# Patient Record
Sex: FEMALE | Race: WHITE | NOT HISPANIC OR LATINO | Employment: FULL TIME | URBAN - METROPOLITAN AREA
[De-identification: names, ages, dates, MRNs, and addresses within clinical notes are randomized per-mention and may not be internally consistent; named-entity substitution may affect disease eponyms.]

---

## 2023-02-24 LAB
ALANINE AMINOTRANSFERASE (SGPT) (U/L) IN SER/PLAS: 45 U/L (ref 7–45)
ALBUMIN (G/DL) IN SER/PLAS: 4.4 G/DL (ref 3.4–5)
ALKALINE PHOSPHATASE (U/L) IN SER/PLAS: 81 U/L (ref 33–110)
ANION GAP IN SER/PLAS: 13 MMOL/L (ref 10–20)
ASPARTATE AMINOTRANSFERASE (SGOT) (U/L) IN SER/PLAS: 30 U/L (ref 9–39)
BILIRUBIN TOTAL (MG/DL) IN SER/PLAS: 0.3 MG/DL (ref 0–1.2)
CALCIUM (MG/DL) IN SER/PLAS: 9.4 MG/DL (ref 8.6–10.3)
CARBON DIOXIDE, TOTAL (MMOL/L) IN SER/PLAS: 25 MMOL/L (ref 21–32)
CHLORIDE (MMOL/L) IN SER/PLAS: 103 MMOL/L (ref 98–107)
CREATININE (MG/DL) IN SER/PLAS: 0.56 MG/DL (ref 0.5–1.05)
GFR FEMALE: >90 ML/MIN/1.73M2
GLUCOSE (MG/DL) IN SER/PLAS: 168 MG/DL (ref 74–99)
POTASSIUM (MMOL/L) IN SER/PLAS: 4.3 MMOL/L (ref 3.5–5.3)
PROTEIN TOTAL: 8.1 G/DL (ref 6.4–8.2)
SEDIMENTATION RATE, ERYTHROCYTE: 29 MM/H (ref 0–30)
SODIUM (MMOL/L) IN SER/PLAS: 137 MMOL/L (ref 136–145)
UREA NITROGEN (MG/DL) IN SER/PLAS: 17 MG/DL (ref 6–23)

## 2023-04-06 LAB — FUNGAL SCREEN, YEAST: ABNORMAL

## 2023-04-29 DIAGNOSIS — E11.9 TYPE 2 DIABETES MELLITUS WITHOUT COMPLICATIONS (MULTI): ICD-10-CM

## 2023-07-14 LAB
ALANINE AMINOTRANSFERASE (SGPT) (U/L) IN SER/PLAS: 34 U/L (ref 7–45)
ALBUMIN (G/DL) IN SER/PLAS: 4.4 G/DL (ref 3.4–5)
ALKALINE PHOSPHATASE (U/L) IN SER/PLAS: 77 U/L (ref 33–110)
ANION GAP IN SER/PLAS: 14 MMOL/L (ref 10–20)
ASPARTATE AMINOTRANSFERASE (SGOT) (U/L) IN SER/PLAS: 26 U/L (ref 9–39)
BILIRUBIN TOTAL (MG/DL) IN SER/PLAS: 0.3 MG/DL (ref 0–1.2)
CALCIUM (MG/DL) IN SER/PLAS: 9.8 MG/DL (ref 8.6–10.3)
CARBON DIOXIDE, TOTAL (MMOL/L) IN SER/PLAS: 23 MMOL/L (ref 21–32)
CHLORIDE (MMOL/L) IN SER/PLAS: 103 MMOL/L (ref 98–107)
CREATININE (MG/DL) IN SER/PLAS: 0.55 MG/DL (ref 0.5–1.05)
ESTIMATED AVERAGE GLUCOSE FOR HBA1C: 177 MG/DL
GFR FEMALE: >90 ML/MIN/1.73M2
GLUCOSE (MG/DL) IN SER/PLAS: 124 MG/DL (ref 74–99)
HEMOGLOBIN A1C/HEMOGLOBIN TOTAL IN BLOOD: 7.8 %
POTASSIUM (MMOL/L) IN SER/PLAS: 4.4 MMOL/L (ref 3.5–5.3)
PROTEIN TOTAL: 8.2 G/DL (ref 6.4–8.2)
SEDIMENTATION RATE, ERYTHROCYTE: 38 MM/H (ref 0–30)
SODIUM (MMOL/L) IN SER/PLAS: 136 MMOL/L (ref 136–145)
UREA NITROGEN (MG/DL) IN SER/PLAS: 19 MG/DL (ref 6–23)

## 2023-10-18 DIAGNOSIS — E87.6 HYPOKALEMIA: ICD-10-CM

## 2023-10-19 RX ORDER — POTASSIUM CHLORIDE 1500 MG/1
20 TABLET, EXTENDED RELEASE ORAL DAILY
Qty: 90 TABLET | Refills: 3 | Status: SHIPPED | OUTPATIENT
Start: 2023-10-19

## 2023-10-26 ENCOUNTER — OFFICE VISIT (OUTPATIENT)
Dept: PRIMARY CARE | Facility: CLINIC | Age: 53
End: 2023-10-26
Payer: COMMERCIAL

## 2023-10-26 VITALS
BODY MASS INDEX: 39.89 KG/M2 | DIASTOLIC BLOOD PRESSURE: 76 MMHG | OXYGEN SATURATION: 95 % | HEIGHT: 65 IN | HEART RATE: 93 BPM | TEMPERATURE: 97 F | SYSTOLIC BLOOD PRESSURE: 124 MMHG | WEIGHT: 239.4 LBS | RESPIRATION RATE: 16 BRPM

## 2023-10-26 DIAGNOSIS — N39.0 URINARY TRACT INFECTION WITHOUT HEMATURIA, SITE UNSPECIFIED: ICD-10-CM

## 2023-10-26 PROBLEM — E11.9 DIABETES MELLITUS (MULTI): Status: ACTIVE | Noted: 2023-10-26

## 2023-10-26 PROBLEM — R10.9 ABDOMINAL PAIN: Status: ACTIVE | Noted: 2023-10-26

## 2023-10-26 PROBLEM — J45.909 ASTHMA (HHS-HCC): Status: ACTIVE | Noted: 2023-10-26

## 2023-10-26 PROBLEM — B37.9 YEAST INFECTION: Status: ACTIVE | Noted: 2023-10-26

## 2023-10-26 PROBLEM — R21 RASH AND OTHER NONSPECIFIC SKIN ERUPTION: Status: ACTIVE | Noted: 2022-08-23

## 2023-10-26 PROBLEM — E88.09 HYPERPROTEINEMIA: Status: ACTIVE | Noted: 2023-10-26

## 2023-10-26 PROBLEM — E11.9 TYPE 2 DIABETES MELLITUS WITHOUT COMPLICATIONS (MULTI): Status: ACTIVE | Noted: 2023-10-26

## 2023-10-26 PROBLEM — N95.1 PERIMENOPAUSAL: Status: ACTIVE | Noted: 2023-10-26

## 2023-10-26 PROBLEM — R16.0 HEPATOMEGALY: Status: ACTIVE | Noted: 2023-10-26

## 2023-10-26 PROBLEM — R92.8 ABNORMAL FINDING ON BREAST IMAGING: Status: ACTIVE | Noted: 2023-10-26

## 2023-10-26 PROBLEM — F32.A DEPRESSION: Status: ACTIVE | Noted: 2023-10-26

## 2023-10-26 PROBLEM — D89.0 POLYCLONAL GAMMOPATHY: Status: ACTIVE | Noted: 2019-12-02

## 2023-10-26 PROBLEM — J01.10 ACUTE FRONTAL SINUSITIS: Status: ACTIVE | Noted: 2023-10-26

## 2023-10-26 PROBLEM — K76.0 FATTY INFILTRATION OF LIVER: Status: ACTIVE | Noted: 2023-10-26

## 2023-10-26 PROBLEM — N91.0 DELAYED PERIOD: Status: ACTIVE | Noted: 2023-10-26

## 2023-10-26 PROBLEM — E55.9 VITAMIN D DEFICIENCY: Status: ACTIVE | Noted: 2023-10-26

## 2023-10-26 PROBLEM — L30.9 DERMATITIS: Status: ACTIVE | Noted: 2023-10-26

## 2023-10-26 PROBLEM — E11.3293 MILD NONPROLIFERATIVE DIABETIC RETINOPATHY OF BOTH EYES ASSOCIATED WITH TYPE 2 DIABETES MELLITUS (MULTI): Status: ACTIVE | Noted: 2023-10-26

## 2023-10-26 PROBLEM — J20.9 ACUTE BRONCHITIS: Status: ACTIVE | Noted: 2023-10-26

## 2023-10-26 PROBLEM — F41.9 ANXIETY: Status: ACTIVE | Noted: 2023-10-26

## 2023-10-26 PROBLEM — J06.9 ACUTE URI: Status: ACTIVE | Noted: 2023-10-26

## 2023-10-26 PROBLEM — I10 HYPERTENSION: Status: ACTIVE | Noted: 2023-10-26

## 2023-10-26 PROBLEM — H52.13 MYOPIA OF BOTH EYES: Status: ACTIVE | Noted: 2023-10-26

## 2023-10-26 PROBLEM — E83.52 HYPERCALCEMIA: Status: ACTIVE | Noted: 2023-10-26

## 2023-10-26 PROBLEM — R39.15 URINARY URGENCY: Status: ACTIVE | Noted: 2023-10-26

## 2023-10-26 PROBLEM — H53.9 VISION CHANGES: Status: ACTIVE | Noted: 2023-10-26

## 2023-10-26 PROBLEM — R11.2 NAUSEA AND VOMITING: Status: ACTIVE | Noted: 2023-10-26

## 2023-10-26 PROBLEM — E78.00 HYPERCHOLESTEROLEMIA: Status: ACTIVE | Noted: 2023-10-26

## 2023-10-26 PROBLEM — H20.9 IRITIS: Status: ACTIVE | Noted: 2023-10-26

## 2023-10-26 PROBLEM — J02.9 SORE THROAT: Status: ACTIVE | Noted: 2023-10-26

## 2023-10-26 PROBLEM — E87.6 HYPOKALEMIA: Status: ACTIVE | Noted: 2023-10-26

## 2023-10-26 PROBLEM — K63.9 COLON WALL THICKENING: Status: ACTIVE | Noted: 2023-10-26

## 2023-10-26 PROBLEM — N63.10 MASS OF BREAST, RIGHT: Status: ACTIVE | Noted: 2023-10-26

## 2023-10-26 PROBLEM — D64.9 ANEMIA: Status: ACTIVE | Noted: 2023-10-26

## 2023-10-26 PROBLEM — R91.8 LUNG NODULES: Status: ACTIVE | Noted: 2023-10-26

## 2023-10-26 PROBLEM — N89.8 VAGINAL ITCHING: Status: ACTIVE | Noted: 2023-10-26

## 2023-10-26 PROBLEM — R74.8 ELEVATED ALKALINE PHOSPHATASE LEVEL: Status: ACTIVE | Noted: 2023-10-26

## 2023-10-26 PROBLEM — M47.819 SERONEGATIVE SPONDYLOARTHROPATHY: Status: ACTIVE | Noted: 2019-12-02

## 2023-10-26 PROBLEM — R30.0 DYSURIA: Status: ACTIVE | Noted: 2023-10-26

## 2023-10-26 LAB
POC APPEARANCE, URINE: CLEAR
POC BILIRUBIN, URINE: NEGATIVE
POC BLOOD, URINE: ABNORMAL
POC COLOR, URINE: ABNORMAL
POC GLUCOSE, URINE: ABNORMAL MG/DL
POC KETONES, URINE: NEGATIVE MG/DL
POC LEUKOCYTES, URINE: ABNORMAL
POC NITRITE,URINE: NEGATIVE
POC PH, URINE: 5.5 PH
POC PROTEIN, URINE: ABNORMAL MG/DL
POC SPECIFIC GRAVITY, URINE: 1.01
POC UROBILINOGEN, URINE: 0.2 EU/DL

## 2023-10-26 PROCEDURE — 99213 OFFICE O/P EST LOW 20 MIN: CPT | Performed by: INTERNAL MEDICINE

## 2023-10-26 PROCEDURE — 1036F TOBACCO NON-USER: CPT | Performed by: INTERNAL MEDICINE

## 2023-10-26 PROCEDURE — 3078F DIAST BP <80 MM HG: CPT | Performed by: INTERNAL MEDICINE

## 2023-10-26 PROCEDURE — 3051F HG A1C>EQUAL 7.0%<8.0%: CPT | Performed by: INTERNAL MEDICINE

## 2023-10-26 PROCEDURE — 4010F ACE/ARB THERAPY RXD/TAKEN: CPT | Performed by: INTERNAL MEDICINE

## 2023-10-26 PROCEDURE — 81003 URINALYSIS AUTO W/O SCOPE: CPT | Performed by: INTERNAL MEDICINE

## 2023-10-26 PROCEDURE — 87186 SC STD MICRODIL/AGAR DIL: CPT

## 2023-10-26 PROCEDURE — 87086 URINE CULTURE/COLONY COUNT: CPT

## 2023-10-26 PROCEDURE — 3074F SYST BP LT 130 MM HG: CPT | Performed by: INTERNAL MEDICINE

## 2023-10-26 RX ORDER — DICLOFENAC SODIUM 75 MG/1
TABLET, DELAYED RELEASE ORAL
COMMUNITY
Start: 2019-10-09

## 2023-10-26 RX ORDER — CLOBETASOL PROPIONATE 0.5 MG/G
CREAM TOPICAL
COMMUNITY
Start: 2022-08-22 | End: 2024-01-05 | Stop reason: WASHOUT

## 2023-10-26 RX ORDER — ERGOCALCIFEROL 1.25 MG/1
CAPSULE ORAL
COMMUNITY
Start: 2021-02-19

## 2023-10-26 RX ORDER — ALBUTEROL SULFATE 90 UG/1
AEROSOL, METERED RESPIRATORY (INHALATION)
COMMUNITY
Start: 2020-03-10

## 2023-10-26 RX ORDER — TIRZEPATIDE 2.5 MG/.5ML
INJECTION, SOLUTION SUBCUTANEOUS
COMMUNITY
Start: 2022-10-11 | End: 2024-01-05 | Stop reason: WASHOUT

## 2023-10-26 RX ORDER — TRIAMCINOLONE ACETONIDE 1 MG/G
1 CREAM TOPICAL 2 TIMES DAILY
COMMUNITY
Start: 2022-07-29 | End: 2024-01-05 | Stop reason: WASHOUT

## 2023-10-26 RX ORDER — FLUOCINONIDE 0.5 MG/G
CREAM TOPICAL
COMMUNITY
Start: 2018-11-28 | End: 2024-01-05 | Stop reason: WASHOUT

## 2023-10-26 RX ORDER — DULAGLUTIDE 1.5 MG/.5ML
INJECTION, SOLUTION SUBCUTANEOUS
COMMUNITY
Start: 2022-11-07

## 2023-10-26 RX ORDER — ASPIRIN 81 MG/1
81 TABLET ORAL
COMMUNITY
Start: 2019-10-09

## 2023-10-26 RX ORDER — HUMAN INSULIN 100 [IU]/ML
INJECTION, SUSPENSION SUBCUTANEOUS
COMMUNITY
Start: 2021-02-17 | End: 2023-11-20

## 2023-10-26 RX ORDER — METFORMIN HYDROCHLORIDE 500 MG/1
TABLET, EXTENDED RELEASE ORAL
COMMUNITY
Start: 2012-11-29 | End: 2024-01-05 | Stop reason: DRUGHIGH

## 2023-10-26 RX ORDER — ROSUVASTATIN CALCIUM 10 MG/1
1 TABLET, COATED ORAL NIGHTLY
COMMUNITY
Start: 2019-02-18 | End: 2024-02-26 | Stop reason: SDUPTHER

## 2023-10-26 RX ORDER — PREDNISONE 5 MG/1
2.5 TABLET ORAL DAILY
COMMUNITY
End: 2024-01-05 | Stop reason: WASHOUT

## 2023-10-26 RX ORDER — NITROFURANTOIN 25; 75 MG/1; MG/1
CAPSULE ORAL
Qty: 14 CAPSULE | Refills: 0 | Status: SHIPPED | OUTPATIENT
Start: 2023-10-26 | End: 2024-01-05 | Stop reason: WASHOUT

## 2023-10-26 RX ORDER — KETOCONAZOLE 20 MG/ML
SHAMPOO, SUSPENSION TOPICAL
COMMUNITY
Start: 2018-06-18 | End: 2024-01-05 | Stop reason: WASHOUT

## 2023-10-26 RX ORDER — GLIPIZIDE 10 MG/1
1 TABLET, FILM COATED, EXTENDED RELEASE ORAL DAILY
COMMUNITY
Start: 2015-09-11 | End: 2024-02-26 | Stop reason: SDUPTHER

## 2023-10-26 RX ORDER — CLINDAMYCIN PHOSPHATE 11.9 MG/ML
SOLUTION TOPICAL
COMMUNITY
Start: 2018-06-18 | End: 2024-01-05 | Stop reason: WASHOUT

## 2023-10-26 RX ORDER — FLUCONAZOLE 150 MG/1
TABLET ORAL
COMMUNITY

## 2023-10-26 RX ORDER — FENOFIBRATE 160 MG/1
160 TABLET ORAL
COMMUNITY
Start: 2019-10-09 | End: 2024-01-05 | Stop reason: WASHOUT

## 2023-10-26 RX ORDER — LISINOPRIL 20 MG/1
1 TABLET ORAL DAILY
COMMUNITY
Start: 2013-07-29 | End: 2024-02-26 | Stop reason: SDUPTHER

## 2023-10-26 RX ORDER — PAROXETINE 10 MG/1
1 TABLET, FILM COATED ORAL DAILY
COMMUNITY
Start: 2012-07-09 | End: 2024-02-26 | Stop reason: SDUPTHER

## 2023-10-26 RX ORDER — LANOLIN ALCOHOL/MO/W.PET/CERES
CREAM (GRAM) TOPICAL
COMMUNITY
Start: 2021-04-22

## 2023-10-26 ASSESSMENT — ENCOUNTER SYMPTOMS
OCCASIONAL FEELINGS OF UNSTEADINESS: 0
DEPRESSION: 0
LOSS OF SENSATION IN FEET: 0

## 2023-10-26 ASSESSMENT — PATIENT HEALTH QUESTIONNAIRE - PHQ9
2. FEELING DOWN, DEPRESSED OR HOPELESS: NOT AT ALL
SUM OF ALL RESPONSES TO PHQ9 QUESTIONS 1 AND 2: 0
2. FEELING DOWN, DEPRESSED OR HOPELESS: NOT AT ALL
1. LITTLE INTEREST OR PLEASURE IN DOING THINGS: NOT AT ALL
1. LITTLE INTEREST OR PLEASURE IN DOING THINGS: NOT AT ALL
SUM OF ALL RESPONSES TO PHQ9 QUESTIONS 1 AND 2: 0

## 2023-10-26 NOTE — PROGRESS NOTES
"Subjective   Patient ID: Daniela Neri is a 52 y.o. female who presents for UTI.    Here with worsening urgency and dysuria over the last day or so.  No fevers chills nausea vomiting no abdominal pain no flank pain no history of kidney stones    She has been on Farxiga-in the past she has had vaginitis issues-she says this is the first UTI she has had in some time         Review of Systems    Objective   /76 (BP Location: Right arm, Patient Position: Sitting, BP Cuff Size: Large adult)   Pulse 93   Temp 36.1 °C (97 °F)   Resp 16   Ht 1.638 m (5' 4.5\")   Wt 109 kg (239 lb 6.4 oz)   SpO2 95%   BMI 40.46 kg/m²     Physical Exam  Vitals reviewed.   Constitutional:       Appearance: Normal appearance.   HENT:      Head: Normocephalic and atraumatic.   Eyes:      General: No scleral icterus.        Right eye: No discharge.         Left eye: No discharge.      Extraocular Movements: Extraocular movements intact.      Conjunctiva/sclera: Conjunctivae normal.      Pupils: Pupils are equal, round, and reactive to light.   Cardiovascular:      Rate and Rhythm: Normal rate and regular rhythm.      Pulses: Normal pulses.      Heart sounds: Normal heart sounds. No murmur heard.  Pulmonary:      Effort: Pulmonary effort is normal.      Breath sounds: Normal breath sounds. No wheezing or rhonchi.   Abdominal:      General: There is no distension.      Tenderness: There is no abdominal tenderness.      Comments: No abdominal discomfort no back or flank discomfort   Musculoskeletal:         General: No deformity or signs of injury. Normal range of motion.      Cervical back: Normal range of motion and neck supple. No rigidity or tenderness.   Lymphadenopathy:      Cervical: No cervical adenopathy.   Skin:     General: Skin is warm and dry.      Findings: No rash.   Neurological:      General: No focal deficit present.      Mental Status: She is alert and oriented to person, place, and time. Mental status is at baseline. "      Cranial Nerves: No cranial nerve deficit.      Sensory: No sensory deficit.      Gait: Gait normal.   Psychiatric:         Mood and Affect: Mood normal.         Behavior: Behavior normal.         Thought Content: Thought content normal.         Judgment: Judgment normal.         Assessment/Plan   Problem List Items Addressed This Visit             ICD-10-CM    Urinary tract infection N39.0    Relevant Orders    POCT UA Automated manually resulted (Completed)    Urine Culture        Urinary urgency/dysuria-urine analysis suspicious-this Thursday afternoon we will treat empirically with nitrofurantoin she will push fluids, use a probiotic and will await urine culture.  She understands if culture shows bacteria that is not covered by the nitrofurantoin she would need to change antibiotics.  She will call with concerns otherwise follow-up with Dr. Lundberg as directed

## 2023-10-27 NOTE — RESULT ENCOUNTER NOTE
Urine analysis suspicious for urinary tract infection-nitrofurantoin prescribed-awaiting urine culture    Dr. Sargent

## 2023-10-28 LAB — BACTERIA UR CULT: ABNORMAL

## 2023-10-29 NOTE — RESULT ENCOUNTER NOTE
Daniela -the urine culture report confirms that the bacteria should be eradicated by the antibiotic I provided when you were in the office.  Please continue to push lots of fluids and follow-up with any persistent symptoms.    Sincerely,  Osbaldo Sargent MD

## 2023-11-07 DIAGNOSIS — N39.0 URINARY TRACT INFECTION WITHOUT HEMATURIA, SITE UNSPECIFIED: Primary | ICD-10-CM

## 2023-11-08 ENCOUNTER — LAB (OUTPATIENT)
Dept: LAB | Facility: LAB | Age: 53
End: 2023-11-08
Payer: COMMERCIAL

## 2023-11-08 DIAGNOSIS — N39.0 URINARY TRACT INFECTION WITHOUT HEMATURIA, SITE UNSPECIFIED: ICD-10-CM

## 2023-11-08 LAB
APPEARANCE UR: ABNORMAL
BACTERIA #/AREA URNS AUTO: ABNORMAL /HPF
BILIRUB UR STRIP.AUTO-MCNC: NEGATIVE MG/DL
COLOR UR: YELLOW
GLUCOSE UR STRIP.AUTO-MCNC: ABNORMAL MG/DL
KETONES UR STRIP.AUTO-MCNC: NEGATIVE MG/DL
LEUKOCYTE ESTERASE UR QL STRIP.AUTO: ABNORMAL
NITRITE UR QL STRIP.AUTO: NEGATIVE
PH UR STRIP.AUTO: 5 [PH]
PROT UR STRIP.AUTO-MCNC: NEGATIVE MG/DL
RBC # UR STRIP.AUTO: ABNORMAL /UL
RBC #/AREA URNS AUTO: ABNORMAL /HPF
SP GR UR STRIP.AUTO: 1.02
SQUAMOUS #/AREA URNS AUTO: ABNORMAL /HPF
UROBILINOGEN UR STRIP.AUTO-MCNC: <2 MG/DL
WBC #/AREA URNS AUTO: >50 /HPF
WBC CLUMPS #/AREA URNS AUTO: ABNORMAL /HPF

## 2023-11-08 PROCEDURE — 81001 URINALYSIS AUTO W/SCOPE: CPT

## 2023-11-08 PROCEDURE — 87186 SC STD MICRODIL/AGAR DIL: CPT

## 2023-11-08 PROCEDURE — 87086 URINE CULTURE/COLONY COUNT: CPT

## 2023-11-10 LAB — BACTERIA UR CULT: ABNORMAL

## 2023-11-12 DIAGNOSIS — R39.15 URINARY URGENCY: ICD-10-CM

## 2023-11-12 DIAGNOSIS — R39.15 URGENCY OF URINATION: ICD-10-CM

## 2023-11-12 DIAGNOSIS — N39.0 URINARY TRACT INFECTION WITHOUT HEMATURIA, SITE UNSPECIFIED: Primary | ICD-10-CM

## 2023-11-12 RX ORDER — CIPROFLOXACIN 500 MG/1
500 TABLET ORAL 2 TIMES DAILY
Qty: 14 TABLET | Refills: 0 | Status: SHIPPED | OUTPATIENT
Start: 2023-11-12 | End: 2023-11-19

## 2023-11-20 DIAGNOSIS — E11.9 TYPE 2 DIABETES MELLITUS WITHOUT COMPLICATION, WITH LONG-TERM CURRENT USE OF INSULIN (MULTI): Primary | ICD-10-CM

## 2023-11-20 DIAGNOSIS — Z79.4 TYPE 2 DIABETES MELLITUS WITHOUT COMPLICATION, WITH LONG-TERM CURRENT USE OF INSULIN (MULTI): Primary | ICD-10-CM

## 2023-11-20 RX ORDER — HUMAN INSULIN 100 [IU]/ML
INJECTION, SUSPENSION SUBCUTANEOUS
Qty: 15 ML | Refills: 3 | Status: SHIPPED | OUTPATIENT
Start: 2023-11-20

## 2023-11-24 ENCOUNTER — LAB (OUTPATIENT)
Dept: LAB | Facility: LAB | Age: 53
End: 2023-11-24
Payer: COMMERCIAL

## 2023-11-24 DIAGNOSIS — R39.15 URGENCY OF URINATION: ICD-10-CM

## 2023-11-24 DIAGNOSIS — R39.15 URINARY URGENCY: ICD-10-CM

## 2023-11-24 LAB
APPEARANCE UR: ABNORMAL
BACTERIA #/AREA URNS AUTO: ABNORMAL /HPF
BILIRUB UR STRIP.AUTO-MCNC: NEGATIVE MG/DL
COLOR UR: ABNORMAL
GLUCOSE UR STRIP.AUTO-MCNC: ABNORMAL MG/DL
KETONES UR STRIP.AUTO-MCNC: ABNORMAL MG/DL
LEUKOCYTE ESTERASE UR QL STRIP.AUTO: ABNORMAL
NITRITE UR QL STRIP.AUTO: NEGATIVE
PH UR STRIP.AUTO: 5 [PH]
PROT UR STRIP.AUTO-MCNC: NEGATIVE MG/DL
RBC # UR STRIP.AUTO: NEGATIVE /UL
RBC #/AREA URNS AUTO: ABNORMAL /HPF
SP GR UR STRIP.AUTO: 1.03
SQUAMOUS #/AREA URNS AUTO: ABNORMAL /HPF
UROBILINOGEN UR STRIP.AUTO-MCNC: <2 MG/DL
WBC #/AREA URNS AUTO: ABNORMAL /HPF

## 2023-11-24 PROCEDURE — 87086 URINE CULTURE/COLONY COUNT: CPT

## 2023-11-24 PROCEDURE — 81001 URINALYSIS AUTO W/SCOPE: CPT

## 2023-11-25 LAB — BACTERIA UR CULT: NORMAL

## 2023-11-26 NOTE — RESULT ENCOUNTER NOTE
Daniela    Blood follow-up urine studies do not suggest a bladder infection at this time.  The urine analysis however does show a great deal of glucose/sugar which can cause urinary urgency and frequency.    Please continue to push ample fluids.  Please continue to watch your glucose values and work to optimize these as you can.  Please Que[place] back with Dr. Lundberg with any additional concerns.    Thanks again for doing this test.    Sincerely,  Osbaldo Sargent MD

## 2024-01-03 ENCOUNTER — APPOINTMENT (OUTPATIENT)
Dept: OPHTHALMOLOGY | Facility: CLINIC | Age: 54
End: 2024-01-03
Payer: COMMERCIAL

## 2024-01-05 ENCOUNTER — OFFICE VISIT (OUTPATIENT)
Dept: RHEUMATOLOGY | Facility: CLINIC | Age: 54
End: 2024-01-05
Payer: COMMERCIAL

## 2024-01-05 VITALS
SYSTOLIC BLOOD PRESSURE: 124 MMHG | HEIGHT: 65 IN | WEIGHT: 240.6 LBS | OXYGEN SATURATION: 97 % | BODY MASS INDEX: 40.08 KG/M2 | TEMPERATURE: 96.4 F | DIASTOLIC BLOOD PRESSURE: 86 MMHG | HEART RATE: 85 BPM

## 2024-01-05 DIAGNOSIS — G89.29 CHRONIC RIGHT SHOULDER PAIN: ICD-10-CM

## 2024-01-05 DIAGNOSIS — M25.511 CHRONIC RIGHT SHOULDER PAIN: ICD-10-CM

## 2024-01-05 DIAGNOSIS — M75.01 ADHESIVE CAPSULITIS OF RIGHT SHOULDER: ICD-10-CM

## 2024-01-05 DIAGNOSIS — E11.9 TYPE 2 DIABETES MELLITUS WITHOUT COMPLICATION, WITHOUT LONG-TERM CURRENT USE OF INSULIN (MULTI): ICD-10-CM

## 2024-01-05 DIAGNOSIS — D86.9 SARCOID: Primary | ICD-10-CM

## 2024-01-05 PROBLEM — M75.00 ADHESIVE CAPSULITIS: Status: ACTIVE | Noted: 2024-01-05

## 2024-01-05 PROCEDURE — 99214 OFFICE O/P EST MOD 30 MIN: CPT | Performed by: INTERNAL MEDICINE

## 2024-01-05 PROCEDURE — 3008F BODY MASS INDEX DOCD: CPT | Performed by: INTERNAL MEDICINE

## 2024-01-05 PROCEDURE — 1036F TOBACCO NON-USER: CPT | Performed by: INTERNAL MEDICINE

## 2024-01-05 PROCEDURE — 4010F ACE/ARB THERAPY RXD/TAKEN: CPT | Performed by: INTERNAL MEDICINE

## 2024-01-05 PROCEDURE — 3074F SYST BP LT 130 MM HG: CPT | Performed by: INTERNAL MEDICINE

## 2024-01-05 PROCEDURE — 3079F DIAST BP 80-89 MM HG: CPT | Performed by: INTERNAL MEDICINE

## 2024-01-05 RX ORDER — METFORMIN HYDROCHLORIDE 500 MG/1
TABLET, EXTENDED RELEASE ORAL
Qty: 120 TABLET | Refills: 3 | Status: SHIPPED | OUTPATIENT
Start: 2024-01-05 | End: 2024-02-26 | Stop reason: SDUPTHER

## 2024-01-05 ASSESSMENT — ENCOUNTER SYMPTOMS
LOSS OF SENSATION IN FEET: 0
OCCASIONAL FEELINGS OF UNSTEADINESS: 0
DEPRESSION: 0

## 2024-01-05 ASSESSMENT — PATIENT HEALTH QUESTIONNAIRE - PHQ9
2. FEELING DOWN, DEPRESSED OR HOPELESS: NOT AT ALL
1. LITTLE INTEREST OR PLEASURE IN DOING THINGS: NOT AT ALL
SUM OF ALL RESPONSES TO PHQ9 QUESTIONS 1 AND 2: 0

## 2024-01-05 NOTE — PATIENT INSTRUCTIONS
Please schedule follow up eye exam.  Check labs: CMP, HbA1C.  Xray right shoulder.  Referred to physical therapy for frozen shoulder. Please talk to  to schedule, or call [phone].  Follow-up in 6 months.

## 2024-01-05 NOTE — PROGRESS NOTES
Subjective   Patient ID: Daniela Neri is a 53 y.o. female who presents for Follow-up.    HPI 53-year-old female here for f/u re: sarcoidosis. This has involved her liver, skin of her scalp, right breast, and retroperitoneal lymph nodes. She also had hypercalcemia and abdominal pain November 2020. (calcium 14.4)  She has been on a gradual prednisone taper.  Prednisone was reduced to 2.5 mg daily December 2022.,  Prednisone was stopped 83/23.     She has not had any recurrence of her rash.  She has not had iritis.  She has not had joint pain, other than right shoulder pain (present for last year(.  Shoulder pain is not as severe as it was initially.  Her range of motion has improved.  She is now able to lift her arm overhead which she could not do initially.  She still has trouble reaching behind her back to try to touch her shoulder blade.  It does hurt somewhat to lay on her shoulder at nighttime.     She otherwise overall feels well.     She is overall currently feeling well. She denies joint pain or rashes.  Last eye exam was April 2021.     Following is a summary of biopsies:   -Right breast biopsy 3/20: Nonnecrotizing granulomatous inflammation with giant cell reaction.   -Scalp biopsy November 2020: Granulomatous inflammation.   -Liver biopsy October 2020: Nonnecrotizing granulomas with associated fibrosis, mild portal inflammation with preserved bile ducts   -Retroperitoneal lymph node biopsy September 2020: Nonnecrotizing granulomas. Stains for AFB and fungus were negative.     The patient first recalls being hospitalized in the sixth grade when she had mononucleosis. She actually had a bone marrow biopsy at that time, and required IV feedings for a period of time.     She developed symptoms of joint pain when she was in the ninth grade. Sometimes she had neck pain or swollen toe or finger.     She had several episodes of iritis when she was in her 30s, but has not had iritis since her 30s. (Last episode  2005)     She developed a right facial weakness 2001, that was diagnosed by a neurologist as post viral polyradiculopathy. She had extensive evaluation at that time, including MRI of the brain. She still has right facial weakness.     She was seen by me for several years with episodic back pain and asymmetric peripheral joint pain. 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 . She has not had much joint pain for the last 15 to 20 years.     She was seen by me November 2019 because of polyclonal gammopathy. She had a mildly elevated alkaline phosphatase of 184, with mildly elevated liver transaminases. Testing for hepatitis A, B and C was negative. Labs were done at that time which showed a normal serum ACE level, negative HLA-B27, negative citrulline antibody, negative rheumatoid factor, negative antimitochondrial antibody, negative smooth muscle antibody, negative Lyme JUA NCARLOS.     She has also had issues with her scalp over the last 2 years, for which she has been seeing Dr. Garcia in dermatology. At 1 point this was thought to possibly be psoriasis. She was treated with ketoconazole shampoo and fluocinonide lotion. Dr. Garcia did a biopsy of her scalp November 2020, there was interpreted as a granulomatous dermatitis.     The patient also had a breast biopsy done March 2020 because of a mass found in her breast. Pathology report was consistent with nonnecrotizing granulomatous inflammation with giant cell reaction.     The patient also developed some abdominal pain August 2020, along with increasing liver enzymes. CT of the abdomen and done August 2020 showed marked splenomegaly, enlarged liver with lobulated lobulated contour, moderate mesenteric and retroperitoneal lymphadenopathy, and wall thickening of the proximal duodenum.     Duodenal biopsy done December 2020 was unremarkable. Gastric biopsy was unremarkable. Colon biopsy was unremarkable. Liver  "biopsy done October 2020 showed well-formed nonnecrotizing granulomas with associated fibrosis, mild portal inflammation with preserved bile ducts, and no fibrosis otherwise demonstrated.     Retroperitoneal lymph node biopsy done September 2020 showed nonnecrotizing granulomas involving fibrous and lymphoid tissue. Stains for AFB and fungus were negative.     The patient was hospitalized November 19, 2020 through November 22, 2020 with nausea and vomiting. She was also found to have hypercalcemia, calcium 14.4. Her alkaline phosphatase had increased as high as 475 June 2020. She was started on prednisone 40 mg daily early 12/20 for presumptive diagnosis of sarcoid.  Prednisone was tapered to 20 mg daily 3/02/21.    She still notes episodic vomiting.  It occurred 3 times July 2023 with bilious emesis in the morning.  She had an episode June 2023 where she vomited 6 times within 20 minutes-this occurred at a wedding.  There were no other guests or syncopal vomiting.  Her last episode of emesis was August 18, 2023.     Other specialists involved in her care include Dr. Almaraz (hepatology) and Dr. Gutierrez (hematology), as well as Dr. Garcia in dermatology.  They agree the diagnosis of sarcoidosis.     DEXA July 2021 was normal.     Labs 2/22: CMP normal except glucose 161 and ALT 47, 25-hydroxy vitamin D 48, CBC normal, hemoglobin A1c 9.4  Labs October 2022: CBC normal, CMP normal except sodium 134 and glucose 220, hemoglobin A1c 9.0, cholesterol 188, HDL 33, triglycerides 805  Labs 2/23: CMP normal except glucose 168, ESR 29  Labs July 2023: CMP normal except glucose 124 and hemoglobin A1c 7.8     Medical problem list:   - h/o seronegative spondyloarthropathy (started 9th grade- had neck stiffness, \"sausage digits\" intermittently.   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   - Obesity- BMI 40   -Sarcoidosis- nonnecrotizing granulomas found on scalp biopsy 11/20, right breast 3/20, liver biopsy 10/20, retroperitoneal lymph node 9/20          Medications: reviewed as documented  Allergies: reviewed as documented.  Surgeries: none.     Social history: , no children.   Quit smoking 2009.   Occupation: .     Family history: Mother- ITP   No family h/o ankylosing spondylitis, psoriasis, ulcerative colitis, Crohn's disease.    Health maintenance:  Flu shot November 9, 2023  Pfizer COVID-vaccine November 9, 2023          ROS:  General: Denies fevers or chills.  CV: Denies chest pain or palpitations.  Denies leg edema.  Lungs: Denies coughing or shortness of breath.  Skin: Denies rashes or nodules.  MS: Denies joint pain or joint swelling.     Objective   /86 (BP Location: Left arm, Patient Position: Sitting, BP Cuff Size: Large adult)   Pulse 85   Temp 35.8 °C (96.4 °F)   Ht 1.638 m (5' 4.5\")   Wt 109 kg (240 lb 9.6 oz)   SpO2 97%   BMI 40.66 kg/m²     Physical Exam  HEENT: PERRL, EOMI  Neck: Supple, no nodes.  CV: RRR, no MGR.  Lungs: Clear, no rales or wheezes.  Abdomen: Soft, nontender. No hepatosplenomegaly.  Extremities:  No cyanosis, clubbing, or edema.  MS: No synovitis.  Right shoulder without warmth or effusion.  Acromioclavicular and sternoclavicular joints nontender.  Bicipital tendon nontender.  She is now able to raise her arm overhead.  She has pain with abduction greater than 90 degrees.  She has limited extension and internal rotation-can internally rotate to L5.  Skin: No rashes or nodules.      Assessment/Plan   Problem List Items Addressed This Visit             ICD-10-CM    Diabetes mellitus (CMS/HCC) E11.9    Relevant Orders    Comprehensive Metabolic Panel    Hemoglobin A1c "    Sarcoid - Primary D86.9    Relevant Orders    Comprehensive Metabolic Panel    Adhesive capsulitis M75.00    Relevant Orders    Referral to Physical Therapy    BMI 40.0-44.9, adult (CMS/[facility]) Z68.41     Other Visit Diagnoses         Codes    Chronic right shoulder pain     M25.511, G89.29    Relevant Orders    XR shoulder right 2+ views                1. Sarcoidosis- manifested by elevated liver enzymes (especially alkaline phosphatase), hypercalcemia, rash in scalp, hepatosplenomegaly with retroperitoneal lymphadenopathy, history of iritis in her 30s. 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 She also had a facial weakness 2001 (mostly on the right side)-this was diagnosed by her neurologist as being post viral polyradiculopathy after an extensive evaluation, including MRI of the brain. I suspect that this may have been a Bell's palsy related to sarcoid as well.     Confirmatory biopsies include breast biopsy March 2020, scalp biopsy November 2020, liver biopsy October 2020, retroperitoneal lymph node biopsy September 2020. T spot 9/20 was negative. Stains for AFB and fungus have been negative.     Her predominant manifestations that require treatment at this point are hypercalcemia and abdominal pain.  Her calcium is currently normal and she is not having abdominal pain.     Prednisone was stopped March 2023 with no recurrence of her previous symptoms.    2. Type 2 diabetes-she will continue follow-up with Kiera Tariq for management. Hemoglobin A1c is currently 7.8 July 2023.     3. BMI 40-stable, will continue to work on weight loss.    4.  Adhesive capsulitis-refer to PT.     Plan:  Please schedule follow up eye exam.  Check labs: CMP, HbA1C.  Xray right shoulder.  Referred to  physical therapy for frozen shoulder. Please talk to zack to schedule, or call [phone].  Follow-up in 6 months.

## 2024-01-15 ENCOUNTER — ANCILLARY PROCEDURE (OUTPATIENT)
Dept: RADIOLOGY | Facility: CLINIC | Age: 54
End: 2024-01-15
Payer: COMMERCIAL

## 2024-01-15 ENCOUNTER — LAB (OUTPATIENT)
Dept: LAB | Facility: LAB | Age: 54
End: 2024-01-15
Payer: COMMERCIAL

## 2024-01-15 DIAGNOSIS — E11.9 TYPE 2 DIABETES MELLITUS WITHOUT COMPLICATION, WITHOUT LONG-TERM CURRENT USE OF INSULIN (MULTI): ICD-10-CM

## 2024-01-15 DIAGNOSIS — M25.511 CHRONIC RIGHT SHOULDER PAIN: ICD-10-CM

## 2024-01-15 DIAGNOSIS — D86.9 SARCOID: ICD-10-CM

## 2024-01-15 DIAGNOSIS — G89.29 CHRONIC RIGHT SHOULDER PAIN: ICD-10-CM

## 2024-01-15 LAB
ALBUMIN SERPL BCP-MCNC: 4.5 G/DL (ref 3.4–5)
ALP SERPL-CCNC: 82 U/L (ref 33–110)
ALT SERPL W P-5'-P-CCNC: 68 U/L (ref 7–45)
ANION GAP SERPL CALC-SCNC: 14 MMOL/L (ref 10–20)
AST SERPL W P-5'-P-CCNC: 54 U/L (ref 9–39)
BILIRUB SERPL-MCNC: 0.4 MG/DL (ref 0–1.2)
BUN SERPL-MCNC: 15 MG/DL (ref 6–23)
CALCIUM SERPL-MCNC: 9.6 MG/DL (ref 8.6–10.3)
CHLORIDE SERPL-SCNC: 104 MMOL/L (ref 98–107)
CO2 SERPL-SCNC: 24 MMOL/L (ref 21–32)
CREAT SERPL-MCNC: 0.55 MG/DL (ref 0.5–1.05)
EGFRCR SERPLBLD CKD-EPI 2021: >90 ML/MIN/1.73M*2
EST. AVERAGE GLUCOSE BLD GHB EST-MCNC: 197 MG/DL
GLUCOSE SERPL-MCNC: 203 MG/DL (ref 74–99)
HBA1C MFR BLD: 8.5 %
POTASSIUM SERPL-SCNC: 4.3 MMOL/L (ref 3.5–5.3)
PROT SERPL-MCNC: 8.3 G/DL (ref 6.4–8.2)
SODIUM SERPL-SCNC: 138 MMOL/L (ref 136–145)

## 2024-01-15 PROCEDURE — 36415 COLL VENOUS BLD VENIPUNCTURE: CPT

## 2024-01-15 PROCEDURE — 83036 HEMOGLOBIN GLYCOSYLATED A1C: CPT

## 2024-01-15 PROCEDURE — 73030 X-RAY EXAM OF SHOULDER: CPT | Mod: RT

## 2024-01-15 PROCEDURE — 80053 COMPREHEN METABOLIC PANEL: CPT

## 2024-01-15 PROCEDURE — 73030 X-RAY EXAM OF SHOULDER: CPT | Mod: RIGHT SIDE | Performed by: RADIOLOGY

## 2024-02-26 DIAGNOSIS — I10 PRIMARY HYPERTENSION: ICD-10-CM

## 2024-02-26 DIAGNOSIS — F41.9 ANXIETY: ICD-10-CM

## 2024-02-26 DIAGNOSIS — E11.9 TYPE 2 DIABETES MELLITUS WITHOUT COMPLICATION, WITHOUT LONG-TERM CURRENT USE OF INSULIN (MULTI): ICD-10-CM

## 2024-02-26 DIAGNOSIS — E78.00 HYPERCHOLESTEROLEMIA: Primary | ICD-10-CM

## 2024-02-26 RX ORDER — ROSUVASTATIN CALCIUM 10 MG/1
10 TABLET, COATED ORAL NIGHTLY
Qty: 90 TABLET | Refills: 3 | Status: SHIPPED | OUTPATIENT
Start: 2024-02-26

## 2024-02-26 RX ORDER — LISINOPRIL 20 MG/1
20 TABLET ORAL DAILY
Qty: 90 TABLET | Refills: 3 | Status: SHIPPED | OUTPATIENT
Start: 2024-02-26

## 2024-02-26 RX ORDER — METFORMIN HYDROCHLORIDE 500 MG/1
TABLET, EXTENDED RELEASE ORAL
Qty: 120 TABLET | Refills: 3 | Status: SHIPPED | OUTPATIENT
Start: 2024-02-26

## 2024-02-26 RX ORDER — PAROXETINE 10 MG/1
10 TABLET, FILM COATED ORAL DAILY
Qty: 90 TABLET | Refills: 3 | Status: SHIPPED | OUTPATIENT
Start: 2024-02-26

## 2024-02-26 RX ORDER — GLIPIZIDE 10 MG/1
10 TABLET, FILM COATED, EXTENDED RELEASE ORAL DAILY
Qty: 90 TABLET | Refills: 3 | Status: SHIPPED | OUTPATIENT
Start: 2024-02-26

## 2024-04-19 ENCOUNTER — APPOINTMENT (OUTPATIENT)
Dept: OPHTHALMOLOGY | Facility: CLINIC | Age: 54
End: 2024-04-19
Payer: COMMERCIAL

## 2024-05-10 DIAGNOSIS — E11.9 TYPE 2 DIABETES MELLITUS WITHOUT COMPLICATIONS (MULTI): ICD-10-CM

## 2024-05-10 RX ORDER — EMPAGLIFLOZIN 10 MG/1
10 TABLET, FILM COATED ORAL
Qty: 90 TABLET | Refills: 3 | Status: SHIPPED | OUTPATIENT
Start: 2024-05-10

## 2024-05-21 ENCOUNTER — APPOINTMENT (OUTPATIENT)
Dept: ENDOCRINOLOGY | Facility: CLINIC | Age: 54
End: 2024-05-21
Payer: COMMERCIAL

## 2024-06-18 ENCOUNTER — LAB (OUTPATIENT)
Dept: LAB | Facility: LAB | Age: 54
End: 2024-06-18
Payer: COMMERCIAL

## 2024-06-18 ENCOUNTER — OFFICE VISIT (OUTPATIENT)
Dept: ENDOCRINOLOGY | Facility: CLINIC | Age: 54
End: 2024-06-18
Payer: COMMERCIAL

## 2024-06-18 VITALS
OXYGEN SATURATION: 97 % | SYSTOLIC BLOOD PRESSURE: 113 MMHG | HEART RATE: 84 BPM | HEIGHT: 64 IN | WEIGHT: 237 LBS | BODY MASS INDEX: 40.46 KG/M2 | DIASTOLIC BLOOD PRESSURE: 74 MMHG

## 2024-06-18 DIAGNOSIS — E11.9 TYPE 2 DIABETES MELLITUS WITHOUT COMPLICATION, WITH LONG-TERM CURRENT USE OF INSULIN (MULTI): ICD-10-CM

## 2024-06-18 DIAGNOSIS — E11.9 TYPE 2 DIABETES MELLITUS WITHOUT COMPLICATION, WITHOUT LONG-TERM CURRENT USE OF INSULIN (MULTI): ICD-10-CM

## 2024-06-18 DIAGNOSIS — R53.83 OTHER FATIGUE: ICD-10-CM

## 2024-06-18 DIAGNOSIS — E55.9 VITAMIN D DEFICIENCY: ICD-10-CM

## 2024-06-18 DIAGNOSIS — N95.1 PERIMENOPAUSE: ICD-10-CM

## 2024-06-18 DIAGNOSIS — Z79.4 TYPE 2 DIABETES MELLITUS WITH HYPERGLYCEMIA, WITH LONG-TERM CURRENT USE OF INSULIN (MULTI): ICD-10-CM

## 2024-06-18 DIAGNOSIS — E11.65 TYPE 2 DIABETES MELLITUS WITH HYPERGLYCEMIA, WITH LONG-TERM CURRENT USE OF INSULIN (MULTI): ICD-10-CM

## 2024-06-18 DIAGNOSIS — E11.65 TYPE 2 DIABETES MELLITUS WITH HYPERGLYCEMIA, WITH LONG-TERM CURRENT USE OF INSULIN (MULTI): Primary | ICD-10-CM

## 2024-06-18 DIAGNOSIS — B37.9 YEAST INFECTION: ICD-10-CM

## 2024-06-18 DIAGNOSIS — Z79.4 TYPE 2 DIABETES MELLITUS WITH HYPERGLYCEMIA, WITH LONG-TERM CURRENT USE OF INSULIN (MULTI): Primary | ICD-10-CM

## 2024-06-18 DIAGNOSIS — Z79.4 TYPE 2 DIABETES MELLITUS WITHOUT COMPLICATION, WITH LONG-TERM CURRENT USE OF INSULIN (MULTI): ICD-10-CM

## 2024-06-18 LAB
25(OH)D3 SERPL-MCNC: 42 NG/ML (ref 30–100)
CREAT UR-MCNC: 37 MG/DL (ref 20–320)
EST. AVERAGE GLUCOSE BLD GHB EST-MCNC: 258 MG/DL
FSH SERPL-ACNC: 44 IU/L
HBA1C MFR BLD: 10.6 %
LH SERPL-ACNC: 18.1 IU/L
MICROALBUMIN UR-MCNC: 7.3 MG/L
MICROALBUMIN/CREAT UR: 19.7 UG/MG CREAT
TSH SERPL-ACNC: 1.9 MIU/L (ref 0.44–3.98)
VIT B12 SERPL-MCNC: 1136 PG/ML (ref 211–911)

## 2024-06-18 PROCEDURE — 99214 OFFICE O/P EST MOD 30 MIN: CPT | Performed by: PHYSICIAN ASSISTANT

## 2024-06-18 PROCEDURE — 3078F DIAST BP <80 MM HG: CPT | Performed by: PHYSICIAN ASSISTANT

## 2024-06-18 PROCEDURE — 82306 VITAMIN D 25 HYDROXY: CPT

## 2024-06-18 PROCEDURE — 4010F ACE/ARB THERAPY RXD/TAKEN: CPT | Performed by: PHYSICIAN ASSISTANT

## 2024-06-18 PROCEDURE — 3008F BODY MASS INDEX DOCD: CPT | Performed by: PHYSICIAN ASSISTANT

## 2024-06-18 PROCEDURE — 83001 ASSAY OF GONADOTROPIN (FSH): CPT

## 2024-06-18 PROCEDURE — 36415 COLL VENOUS BLD VENIPUNCTURE: CPT

## 2024-06-18 PROCEDURE — 3074F SYST BP LT 130 MM HG: CPT | Performed by: PHYSICIAN ASSISTANT

## 2024-06-18 PROCEDURE — 82570 ASSAY OF URINE CREATININE: CPT

## 2024-06-18 PROCEDURE — 82607 VITAMIN B-12: CPT

## 2024-06-18 PROCEDURE — 1036F TOBACCO NON-USER: CPT | Performed by: PHYSICIAN ASSISTANT

## 2024-06-18 PROCEDURE — 84443 ASSAY THYROID STIM HORMONE: CPT

## 2024-06-18 PROCEDURE — 82043 UR ALBUMIN QUANTITATIVE: CPT

## 2024-06-18 PROCEDURE — 3052F HG A1C>EQUAL 8.0%<EQUAL 9.0%: CPT | Performed by: PHYSICIAN ASSISTANT

## 2024-06-18 PROCEDURE — 83002 ASSAY OF GONADOTROPIN (LH): CPT

## 2024-06-18 PROCEDURE — 83036 HEMOGLOBIN GLYCOSYLATED A1C: CPT

## 2024-06-18 RX ORDER — GLIPIZIDE 10 MG/1
10 TABLET, FILM COATED, EXTENDED RELEASE ORAL DAILY
Qty: 90 TABLET | Refills: 3 | Status: SHIPPED | OUTPATIENT
Start: 2024-06-18

## 2024-06-18 RX ORDER — HUMAN INSULIN 100 [IU]/ML
INJECTION, SUSPENSION SUBCUTANEOUS
Qty: 45 ML | Refills: 3 | Status: SHIPPED | OUTPATIENT
Start: 2024-06-18

## 2024-06-18 RX ORDER — FLUCONAZOLE 150 MG/1
150 TABLET ORAL ONCE
Qty: 1 TABLET | Refills: 0 | Status: SHIPPED | OUTPATIENT
Start: 2024-06-18 | End: 2024-06-18

## 2024-06-18 RX ORDER — METFORMIN HYDROCHLORIDE 500 MG/1
TABLET, EXTENDED RELEASE ORAL
Qty: 120 TABLET | Refills: 3 | Status: SHIPPED | OUTPATIENT
Start: 2024-06-18

## 2024-06-18 RX ORDER — TIRZEPATIDE 2.5 MG/.5ML
INJECTION, SOLUTION SUBCUTANEOUS
Qty: 2 ML | Refills: 1 | Status: SHIPPED | OUTPATIENT
Start: 2024-06-18 | End: 2025-07-18

## 2024-06-18 ASSESSMENT — ENCOUNTER SYMPTOMS
DEPRESSION: 0
LOSS OF SENSATION IN FEET: 0
OCCASIONAL FEELINGS OF UNSTEADINESS: 0

## 2024-06-18 ASSESSMENT — PATIENT HEALTH QUESTIONNAIRE - PHQ9
SUM OF ALL RESPONSES TO PHQ9 QUESTIONS 1 AND 2: 0
1. LITTLE INTEREST OR PLEASURE IN DOING THINGS: NOT AT ALL
2. FEELING DOWN, DEPRESSED OR HOPELESS: NOT AT ALL

## 2024-06-18 ASSESSMENT — COLUMBIA-SUICIDE SEVERITY RATING SCALE - C-SSRS
2. HAVE YOU ACTUALLY HAD ANY THOUGHTS OF KILLING YOURSELF?: NO
1. IN THE PAST MONTH, HAVE YOU WISHED YOU WERE DEAD OR WISHED YOU COULD GO TO SLEEP AND NOT WAKE UP?: NO
6. HAVE YOU EVER DONE ANYTHING, STARTED TO DO ANYTHING, OR PREPARED TO DO ANYTHING TO END YOUR LIFE?: NO

## 2024-06-18 ASSESSMENT — PAIN SCALES - GENERAL: PAINLEVEL: 0-NO PAIN

## 2024-06-18 NOTE — PATIENT INSTRUCTIONS
Type 2 diabetes mellitus with hyperglycemia, with long-term current use of insulin (Multi)  -     Hemoglobin A1c; Standing  -     Albumin-Creatinine Ratio, Random Urine; Future  -     tirzepatide (Mounjaro) 2.5 mg/0.5 mL pen injector; Inject 2.5 mg under the skin 1 (one) time per week for 30 days, THEN 5 mg 1 (one) time per week.  -     Referral to Endocrinology; Future  Other fatigue  -     TSH with reflex to Free T4 if abnormal; Future  -     FSH & LH; Future  -     Vitamin B12; Future  Vitamin D deficiency  -     Vitamin D 25-Hydroxy,Total (for eval of Vitamin D levels); Future  Perimenopause  -     FSH & LH; Future  -     Referral to Endocrinology; Future  Type 2 diabetes mellitus without complication, without long-term current use of insulin (Multi)  -     metFORMIN  mg 24 hr tablet; Take 4 tabs with dinner  -     glipiZIDE XL (Glucotrol XL) 10 mg 24 hr tablet; Take 1 tablet (10 mg) by mouth once daily.  Type 2 diabetes mellitus without complication, with long-term current use of insulin (Multi)  -     insulin NPH, Isophane, (NovoLIN N FlexPen) 100 unit/mL (3 mL) injection; Inject 20 units once in the morning and 10 units in the evening  Yeast infection  -     fluconazole (Diflucan) 150 mg tablet; Take 1 tablet (150 mg) by mouth 1 time for 1 dose.      Type 2 diabetes mellitus, is not at goal. A1c 8.5% 1/15/24. Due for update. Ordered today.     RX changes: Stop Jardiance 10am in the morning for now. Continue Novolin 20u in morning. Discontinue Trulicity. Continue Glipizide 10mg in afternoon and Metformin 2000mg in evening.    Plan to add mounjaro   Then add Continue Glucose Monitor    Continue exercise and meal planning.     Education:  exercise, nutrition, and medication adjustments.  Follow up: I recommend diabetes care be 3 months. Transfer care to Dr. Hutton.

## 2024-06-18 NOTE — PROGRESS NOTES
Subjective   Daniela Neri is a 53 y.o. female who presents for follow-up of Type 2 diabetes mellitus. The initial diagnosis of diabetes was made  years ago . The patient does have a known family history of diabetes. Has h/o steroid-induced hyperglycemia, due to chronic steroid management. Although, pt reports no longer using steroids.     Known complications due to diabetes included obesity    Cardiovascular risk factors include diabetes mellitus, hypertension, and obesity (BMI >= 30 kg/m2), and hyperlipidemia. The patient is on an ACE inhibitor or angiotensin II receptor blocker.  The patient has not been previously hospitalized due to diabetic ketoacidosis.     Current symptoms/problems include hyperglycemia and chronic yeast infection . Her clinical course has fluctuated. Pt reports periods over the past year of feeling defeated by medication regimen and hyperglycemic readings, during which she stopped taking her medications altogether. In addition, pt reports difficulty obtaining Trulicity prescription from the pharmacy.  Overall, pt feels frustrated and is struggling with motivation to exercise and is bored of healthy eating options.     Feels energy level is average.     Current diabetes regimen is as follows:  Glipizide 10mg   Novolin 20u in the morning (not taking in evening)  Metformin 2,000mg with dinner   Trulicty (stopped due to pharmacy inconsistency)   Jardiance (plan to discontinue due to side effects)     Taking supplements - ceylon cinnamon 1200 twice daily and berberine 500 twice daily; triple omega      The patient is currently checking her blood glucose 1 times per day, in the morning.     Patient is using:  blood glucose meter at home    Hypoglycemia frequency: never  Hypoglycemia awareness: unknown due to lack of frequency     Hyperglycemia awareness: yes; feels like she can't sit still and her body temperature feels off when her blood sugar is very elevated.    Exercise:  Has resumed exercise  "regimen now that pool has opened. Plans to swim 4-5x/ week. Is not currently walking, but will consider adding it to her regimen.      Meal panning: She  cooks during the weeks, but does go out to eat on the weekends. At home, she cooks grilled chicken and pork chops. Occasionally preps veggie trays for the week. Tries to eat limited carbs at home with meals, but desires them when she eats out. Admits to falling behind on meal planning .      Review of Systems   All other systems reviewed and are negative.      Objective   /74 (BP Location: Left arm, Patient Position: Sitting)   Pulse 84   Ht 1.626 m (5' 4\")   Wt 108 kg (237 lb)   SpO2 97%   BMI 40.68 kg/m²   Physical Exam  Constitutional:       Appearance: She is obese.   HENT:      Head: Normocephalic and atraumatic.      Nose: Nose normal.   Eyes:      Extraocular Movements: Extraocular movements intact.   Cardiovascular:      Rate and Rhythm: Normal rate and regular rhythm.      Heart sounds: Normal heart sounds.   Pulmonary:      Effort: Pulmonary effort is normal.      Breath sounds: Normal breath sounds.   Musculoskeletal:      Right foot: No deformity.      Left foot: No deformity.   Feet:      Right foot:      Protective Sensation: 10 sites tested.        Skin integrity: No ulcer.      Left foot:      Protective Sensation: 10 sites tested.        Skin integrity: Skin breakdown and dry skin present. No ulcer.   Skin:     General: Skin is dry.   Neurological:      General: No focal deficit present.   Psychiatric:         Mood and Affect: Mood normal.         Behavior: Behavior normal.         Thought Content: Thought content normal.         Judgment: Judgment normal.         Lab Review  Glucose (mg/dL)   Date Value   01/15/2024 203 (H)   07/14/2023 124 (H)   02/24/2023 168 (H)   10/08/2022 220 (H)     Hemoglobin A1C (%)   Date Value   01/15/2024 8.5 (H)   07/14/2023 7.8 (A)   10/08/2022 9.0 (A)   07/29/2022 10.4 (A)     Bicarbonate (mmol/L)   Date " Value   01/15/2024 24   07/14/2023 23   02/24/2023 25   10/08/2022 22     Urea Nitrogen (mg/dL)   Date Value   01/15/2024 15   07/14/2023 19   02/24/2023 17   10/08/2022 19     Creatinine (mg/dL)   Date Value   01/15/2024 0.55   07/14/2023 0.55   02/24/2023 0.56   10/08/2022 0.75       Health Maintenance:   Foot Exam: updated today. Encouraged to use hydrocortisone cream and emollient for itch relief and moisture.   Eye Exam: due for update; upcoming appointment later this month   Lipid Panel: due for update  Urine Albumin: due for update; ordered    Assessment/Plan   Diagnoses and all orders for this visit:  Type 2 diabetes mellitus with hyperglycemia, with long-term current use of insulin (Multi)  -     Hemoglobin A1c; Standing  -     Albumin-Creatinine Ratio, Random Urine; Future  -     tirzepatide (Mounjaro) 2.5 mg/0.5 mL pen injector; Inject 2.5 mg under the skin 1 (one) time per week for 30 days, THEN 5 mg 1 (one) time per week.  -     Referral to Endocrinology; Future  Other fatigue  -     TSH with reflex to Free T4 if abnormal; Future  -     FSH & LH; Future  -     Vitamin B12; Future  Vitamin D deficiency  -     Vitamin D 25-Hydroxy,Total (for eval of Vitamin D levels); Future  Perimenopause  -     FSH & LH; Future  -     Referral to Endocrinology; Future  Type 2 diabetes mellitus without complication, without long-term current use of insulin (Multi)  -     metFORMIN  mg 24 hr tablet; Take 4 tabs with dinner  -     glipiZIDE XL (Glucotrol XL) 10 mg 24 hr tablet; Take 1 tablet (10 mg) by mouth once daily.  Type 2 diabetes mellitus without complication, with long-term current use of insulin (Multi)  -     insulin NPH, Isophane, (NovoLIN N FlexPen) 100 unit/mL (3 mL) injection; Inject 20 units once in the morning and 10 units in the evening  Yeast infection  -     fluconazole (Diflucan) 150 mg tablet; Take 1 tablet (150 mg) by mouth 1 time for 1 dose.      Type 2 diabetes mellitus, is not at goal. A1c  8.5% 1/15/24. Due for update. Ordered today.     RX changes:  1.RX changes: Stop Jardiance 10am in the morning for now. Continue Novolin 20u in morning. Discontinue Trulicity. Continue Glipizide 10mg in afternoon and Metformin 2000mg in evening.       Plan to add mounjaro   Then add Continue Glucose Monitor    Continue exercise and meal planning.     Education:  exercise, nutrition, and medication adjustments.  Follow up: I recommend diabetes care be 3 months. Transfer care to Dr. Hutton.

## 2024-06-27 ENCOUNTER — APPOINTMENT (OUTPATIENT)
Dept: OPHTHALMOLOGY | Facility: CLINIC | Age: 54
End: 2024-06-27
Payer: COMMERCIAL

## 2024-06-27 DIAGNOSIS — D86.9 SARCOID: ICD-10-CM

## 2024-06-27 DIAGNOSIS — M47.819 SERONEGATIVE SPONDYLOARTHROPATHY: ICD-10-CM

## 2024-06-27 DIAGNOSIS — H52.4 PRESBYOPIA: Primary | ICD-10-CM

## 2024-06-27 DIAGNOSIS — E11.9 TYPE 2 DIABETES MELLITUS WITHOUT COMPLICATION, WITHOUT LONG-TERM CURRENT USE OF INSULIN (MULTI): ICD-10-CM

## 2024-06-27 DIAGNOSIS — H20.9 IRITIS: ICD-10-CM

## 2024-06-27 PROCEDURE — 92015 DETERMINE REFRACTIVE STATE: CPT | Performed by: OPTOMETRIST

## 2024-06-27 PROCEDURE — 92014 COMPRE OPH EXAM EST PT 1/>: CPT | Performed by: OPTOMETRIST

## 2024-06-27 RX ORDER — TIMOLOL MALEATE 5 MG/ML
1 SOLUTION/ DROPS OPHTHALMIC 2 TIMES DAILY
Qty: 5 ML | Refills: 2 | Status: SHIPPED | OUTPATIENT
Start: 2024-06-27 | End: 2025-06-27

## 2024-06-27 RX ORDER — PREDNISOLONE ACETATE 10 MG/ML
1 SUSPENSION/ DROPS OPHTHALMIC 3 TIMES DAILY
Qty: 5 ML | Refills: 0 | Status: SHIPPED | OUTPATIENT
Start: 2024-06-27 | End: 2024-07-11

## 2024-06-27 ASSESSMENT — EXTERNAL EXAM - LEFT EYE: OS_EXAM: NORMAL

## 2024-06-27 ASSESSMENT — REFRACTION_MANIFEST
OD_CYLINDER: -0.75
OD_SPHERE: +0.25
OS_ADD: +2.25
OS_SPHERE: PLANO
OD_ADD: +2.25
OD_AXIS: 095

## 2024-06-27 ASSESSMENT — CONF VISUAL FIELD
OS_INFERIOR_TEMPORAL_RESTRICTION: 0
OS_SUPERIOR_TEMPORAL_RESTRICTION: 0
METHOD: COUNTING FINGERS
OS_NORMAL: 1
OD_SUPERIOR_NASAL_RESTRICTION: 0
OS_SUPERIOR_NASAL_RESTRICTION: 0
OD_INFERIOR_TEMPORAL_RESTRICTION: 0
OS_INFERIOR_NASAL_RESTRICTION: 0
OD_INFERIOR_NASAL_RESTRICTION: 0
OD_NORMAL: 1
OD_SUPERIOR_TEMPORAL_RESTRICTION: 0

## 2024-06-27 ASSESSMENT — REFRACTION_WEARINGRX
OS_SPHERE: -0.50
OD_AXIS: 085
OD_CYLINDER: -0.50
OD_SPHERE: -0.50

## 2024-06-27 ASSESSMENT — ENCOUNTER SYMPTOMS
CONSTITUTIONAL NEGATIVE: 0
MUSCULOSKELETAL NEGATIVE: 0
ENDOCRINE NEGATIVE: 1
RESPIRATORY NEGATIVE: 0
EYES NEGATIVE: 1
ALLERGIC/IMMUNOLOGIC NEGATIVE: 0
CARDIOVASCULAR NEGATIVE: 0
HEMATOLOGIC/LYMPHATIC NEGATIVE: 0
GASTROINTESTINAL NEGATIVE: 0
PSYCHIATRIC NEGATIVE: 0
NEUROLOGICAL NEGATIVE: 0

## 2024-06-27 ASSESSMENT — TONOMETRY
OD_IOP_MMHG: 22
OS_IOP_MMHG: 22
IOP_METHOD: GOLDMANN APPLANATION

## 2024-06-27 ASSESSMENT — VISUAL ACUITY
METHOD: SNELLEN - LINEAR
OS_CC: 20/25
OD_CC: 20/20

## 2024-06-27 ASSESSMENT — EXTERNAL EXAM - RIGHT EYE: OD_EXAM: NORMAL

## 2024-06-27 ASSESSMENT — SLIT LAMP EXAM - LIDS
COMMENTS: GOOD POSITION
COMMENTS: GOOD POSITION

## 2024-06-27 ASSESSMENT — CUP TO DISC RATIO
OS_RATIO: .2
OD_RATIO: .2

## 2024-06-27 ASSESSMENT — REFRACTION
OS_SPHERE: PLANO
OD_CYLINDER: -0.75
OD_AXIS: 090
OD_SPHERE: PLANO

## 2024-06-27 NOTE — Clinical Note
Jesús Kern,  I'd like for this patient to follow up w/ Dr. Lowery in 2-3 weeks. 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 [facility] is the preferred office for her.  Thanks!

## 2024-06-27 NOTE — Clinical Note
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 I'm concerned that her posterior synechia may be worsening. I've referred her to our uveitis specialist to see if a synechialysis may be indicated. I started topical pred and timolol for the inflammation and to reduce the risk of an IOP spike.  Cheko,  Aisha

## 2024-06-27 NOTE — PROGRESS NOTES
Assessment/Plan   Diagnoses and all orders for this visit:  Presbyopia  New spec rx released today per patient request. Ocular health wnl for age OU. Monitor 1 year or sooner prn. Refraction billed today.    Seronegative spondyloarthropathy  Sarcoid  Iritis  -     prednisoLONE acetate (Pred-Forte) 1 % ophthalmic suspension; Administer 1 drop into both eyes 3 times a day for 14 days.  -     timolol (Timoptic) 0.5 % ophthalmic solution; Administer 1 drop into both eyes 2 times a day.  Discussed. Iritis reactivated today. Concern that PS is getting worse. Will consult Dr. Lowery to see if a synechialysis may be indicated.   Could not break in the office w/ mydriatics today.     Type 2 diabetes mellitus without complication, without long-term current use of insulin (Multi)  The patient has diabetes without any evidence of retinopathy.  The patient was advised to maintain tight glucose control, tight blood pressure control, and favorable levels of cholesterol, low density lipoprotein, and high density lipoproteins.  Follow up in one year was recommended.

## 2024-07-02 ENCOUNTER — OFFICE VISIT (OUTPATIENT)
Dept: PRIMARY CARE | Facility: CLINIC | Age: 54
End: 2024-07-02
Payer: COMMERCIAL

## 2024-07-02 VITALS
RESPIRATION RATE: 16 BRPM | BODY MASS INDEX: 40.2 KG/M2 | WEIGHT: 234.2 LBS | DIASTOLIC BLOOD PRESSURE: 70 MMHG | TEMPERATURE: 98 F | OXYGEN SATURATION: 98 % | SYSTOLIC BLOOD PRESSURE: 116 MMHG | HEART RATE: 85 BPM

## 2024-07-02 DIAGNOSIS — E11.3293 MILD NONPROLIFERATIVE DIABETIC RETINOPATHY OF BOTH EYES ASSOCIATED WITH TYPE 2 DIABETES MELLITUS, MACULAR EDEMA PRESENCE UNSPECIFIED (MULTI): ICD-10-CM

## 2024-07-02 DIAGNOSIS — J02.9 PHARYNGITIS, UNSPECIFIED ETIOLOGY: Primary | ICD-10-CM

## 2024-07-02 DIAGNOSIS — D86.9 SARCOID: ICD-10-CM

## 2024-07-02 DIAGNOSIS — J02.9 SORE THROAT: ICD-10-CM

## 2024-07-02 DIAGNOSIS — M47.819 SERONEGATIVE SPONDYLOARTHROPATHY: ICD-10-CM

## 2024-07-02 DIAGNOSIS — J45.20 MILD INTERMITTENT ASTHMA, UNSPECIFIED WHETHER COMPLICATED (HHS-HCC): ICD-10-CM

## 2024-07-02 DIAGNOSIS — I10 PRIMARY HYPERTENSION: ICD-10-CM

## 2024-07-02 DIAGNOSIS — K76.0 FATTY INFILTRATION OF LIVER: ICD-10-CM

## 2024-07-02 LAB — POC RAPID STREP: NEGATIVE

## 2024-07-02 PROCEDURE — 1036F TOBACCO NON-USER: CPT | Performed by: INTERNAL MEDICINE

## 2024-07-02 PROCEDURE — 3074F SYST BP LT 130 MM HG: CPT | Performed by: INTERNAL MEDICINE

## 2024-07-02 PROCEDURE — 87081 CULTURE SCREEN ONLY: CPT

## 2024-07-02 PROCEDURE — 3078F DIAST BP <80 MM HG: CPT | Performed by: INTERNAL MEDICINE

## 2024-07-02 PROCEDURE — 87880 STREP A ASSAY W/OPTIC: CPT | Performed by: INTERNAL MEDICINE

## 2024-07-02 PROCEDURE — 99214 OFFICE O/P EST MOD 30 MIN: CPT | Performed by: INTERNAL MEDICINE

## 2024-07-02 PROCEDURE — 4010F ACE/ARB THERAPY RXD/TAKEN: CPT | Performed by: INTERNAL MEDICINE

## 2024-07-02 PROCEDURE — 3061F NEG MICROALBUMINURIA REV: CPT | Performed by: INTERNAL MEDICINE

## 2024-07-02 PROCEDURE — 3008F BODY MASS INDEX DOCD: CPT | Performed by: INTERNAL MEDICINE

## 2024-07-02 PROCEDURE — 3046F HEMOGLOBIN A1C LEVEL >9.0%: CPT | Performed by: INTERNAL MEDICINE

## 2024-07-02 RX ORDER — AMOXICILLIN 875 MG/1
875 TABLET, FILM COATED ORAL 2 TIMES DAILY
Qty: 20 TABLET | Refills: 0 | Status: SHIPPED | OUTPATIENT
Start: 2024-07-02 | End: 2024-07-12

## 2024-07-02 ASSESSMENT — ENCOUNTER SYMPTOMS
COUGH: 1
NECK STIFFNESS: 0
STRIDOR: 0
ABDOMINAL PAIN: 0
BLOOD IN STOOL: 0
WEAKNESS: 0
VOICE CHANGE: 0
WHEEZING: 0
ACTIVITY CHANGE: 0
COLOR CHANGE: 0
LIGHT-HEADEDNESS: 0
HEADACHES: 0
NAUSEA: 0
GASTROINTESTINAL NEGATIVE: 1
FLANK PAIN: 0
DIZZINESS: 0
DIARRHEA: 0
ADENOPATHY: 0
NUMBNESS: 0
SORE THROAT: 1
TREMORS: 0
SINUS PRESSURE: 0
NEUROLOGICAL NEGATIVE: 1
EYE REDNESS: 0
HALLUCINATIONS: 0
SLEEP DISTURBANCE: 0
FREQUENCY: 0
SEIZURES: 0
CONFUSION: 0
POLYDIPSIA: 0
SPEECH DIFFICULTY: 0
BRUISES/BLEEDS EASILY: 0
CONSTIPATION: 0
EYE PAIN: 0
UNEXPECTED WEIGHT CHANGE: 0
AGITATION: 0
DYSURIA: 0
NECK PAIN: 0
HEMATOLOGIC/LYMPHATIC NEGATIVE: 1
NERVOUS/ANXIOUS: 0
ALLERGIC/IMMUNOLOGIC NEGATIVE: 1
SINUS PAIN: 0
MYALGIAS: 0
ARTHRALGIAS: 1
JOINT SWELLING: 0
CARDIOVASCULAR NEGATIVE: 1
BACK PAIN: 0
DIFFICULTY URINATING: 0
PALPITATIONS: 0
VOMITING: 0
TROUBLE SWALLOWING: 0
PSYCHIATRIC NEGATIVE: 1
EYE DISCHARGE: 0
WOUND: 0
CHEST TIGHTNESS: 0
ENDOCRINE NEGATIVE: 1
FATIGUE: 1
SHORTNESS OF BREATH: 0
FACIAL ASYMMETRY: 0
EYES NEGATIVE: 1
POLYPHAGIA: 0
APPETITE CHANGE: 0

## 2024-07-02 ASSESSMENT — PATIENT HEALTH QUESTIONNAIRE - PHQ9
SUM OF ALL RESPONSES TO PHQ9 QUESTIONS 1 AND 2: 0
2. FEELING DOWN, DEPRESSED OR HOPELESS: NOT AT ALL
1. LITTLE INTEREST OR PLEASURE IN DOING THINGS: NOT AT ALL

## 2024-07-02 NOTE — PROGRESS NOTES
Subjective   Patient ID: Daniela Neri is a 53 y.o. female who presents for Sore Throat (Pt is here due to having a sore throat).  HPI    This is 54 yo female with very complex medical history including DM (uncontrolled), HTN, HLD, obesity, fatty liver, asthma, seronegative spondyloarthropathy, sarcoidosis.     Patient has not  been feeling well for 4 days,   C/o sore throat, dry cough.  Denies fever, chills, SOB.  Tried OTC medications without help.    She  has not been always complaint with prescribed medications.     We reviewed and discussed details of recent blood work: CBC, CMP, TSH, Lipid panel, Hb A1c done in 2024.  Results within acceptable range except highly elevated glucose and HbA1c.    She established with endocrinology MIGUEL Tariq and her medications have been adjusted.  Will see new endocrinologist in October.      She has been following with Dr. Olmos regarding sarcoidosis and dose of prednisone was gradually reduced, then weaned off.      She was [facility] ER in October 2021with abdominal pain. Diagnosed with colitis, symptoms improved after tx with antibiotic. CT showed thickened wall of abdomen.     Her last colonoscopy was in 2020 and normal, next screening colonoscopy[y advised in 2030.        Review of Systems   Constitutional:  Positive for fatigue. Negative for activity change, appetite change and unexpected weight change.   HENT:  Positive for sore throat. Negative for congestion, ear discharge, ear pain, hearing loss, mouth sores, nosebleeds, sinus pressure, sinus pain, trouble swallowing and voice change.    Eyes: Negative.  Negative for pain, discharge, redness and visual disturbance.   Respiratory:  Positive for cough. Negative for chest tightness, shortness of breath, wheezing and stridor.    Cardiovascular: Negative.  Negative for chest pain, palpitations and leg swelling.   Gastrointestinal: Negative.  Negative for abdominal pain, blood in stool, constipation, diarrhea, nausea  and vomiting.   Endocrine: Negative.  Negative for polydipsia, polyphagia and polyuria.   Genitourinary: Negative.  Negative for difficulty urinating, dysuria, flank pain, frequency and urgency.   Musculoskeletal:  Positive for arthralgias. Negative for back pain, gait problem, joint swelling, myalgias, neck pain and neck stiffness.   Skin: Negative.  Negative for color change, rash and wound.   Allergic/Immunologic: Negative.  Negative for environmental allergies, food allergies and immunocompromised state.   Neurological: Negative.  Negative for dizziness, tremors, seizures, syncope, facial asymmetry, speech difficulty, weakness, light-headedness, numbness and headaches.   Hematological: Negative.  Negative for adenopathy. Does not bruise/bleed easily.   Psychiatric/Behavioral: Negative.  Negative for agitation, behavioral problems, confusion, hallucinations, sleep disturbance and suicidal ideas. The patient is not nervous/anxious.    All other systems reviewed and are negative.      Objective     Review of systems was performed and all systems were negative except what in HPI    /70   Pulse 85   Temp 36.7 °C (98 °F) (Temporal)   Resp 16   Wt 106 kg (234 lb 3.2 oz)   SpO2 98%   BMI 40.20 kg/m²    Physical Exam  Vitals and nursing note reviewed.   Constitutional:       General: She is not in acute distress.     Appearance: Normal appearance.   HENT:      Head: Normocephalic and atraumatic.      Right Ear: Tympanic membrane, ear canal and external ear normal.      Left Ear: Tympanic membrane, ear canal and external ear normal.      Nose: Nose normal. No congestion or rhinorrhea.      Mouth/Throat:      Pharynx: Pharyngeal swelling and posterior oropharyngeal erythema present.   Eyes:      General:         Right eye: No discharge.         Left eye: No discharge.      Extraocular Movements: Extraocular movements intact.      Conjunctiva/sclera: Conjunctivae normal.      Pupils: Pupils are equal, round, and  reactive to light.   Cardiovascular:      Rate and Rhythm: Normal rate and regular rhythm.      Pulses: Normal pulses.      Heart sounds: Normal heart sounds. No murmur heard.     No friction rub. No gallop.   Pulmonary:      Effort: Pulmonary effort is normal. No respiratory distress.      Breath sounds: Normal breath sounds. No stridor. No wheezing, rhonchi or rales.   Chest:      Chest wall: No tenderness.   Abdominal:      General: Bowel sounds are normal.      Palpations: Abdomen is soft. There is no mass.      Tenderness: There is no abdominal tenderness. There is no guarding or rebound.   Musculoskeletal:         General: No swelling or deformity. Normal range of motion.      Cervical back: Normal range of motion and neck supple. No rigidity or tenderness.      Right lower leg: No edema.      Left lower leg: No edema.   Lymphadenopathy:      Cervical: No cervical adenopathy.   Skin:     General: Skin is warm and dry.      Coloration: Skin is not jaundiced.      Findings: No bruising or erythema.   Neurological:      General: No focal deficit present.      Mental Status: She is alert and oriented to person, place, and time. Mental status is at baseline.      Cranial Nerves: No cranial nerve deficit.      Motor: No weakness.      Coordination: Coordination normal.      Gait: Gait normal.   Psychiatric:         Mood and Affect: Mood normal.         Behavior: Behavior normal.         Thought Content: Thought content normal.         Judgment: Judgment normal.         Assessment/Plan   Problem List Items Addressed This Visit             ICD-10-CM       Cardiac and Vasculature    Hypertension I10     Continue current treatment.             ENT    Pharyngitis - Primary J02.9    Relevant Medications    amoxicillin (Amoxil) 875 mg tablet    Other Relevant Orders    Group A Streptococcus, Culture    POCT Rapid Strep A manually resulted (Completed)       Eye    Mild nonproliferative diabetic retinopathy of both eyes  associated with type 2 diabetes mellitus (Multi) E11.3293     Clinically stable. F/up with ophthalmology.             Gastrointestinal and Abdominal    Fatty infiltration of liver K76.0     Weight loss is advised. Target BMI: 25. Please follow low carbohydrate diet and exercise at least 150 minutes weekly.  Nutritional consultation is available, please let me know if interested.             Multi-system (Lupus, Sarcoid)    Sarcoid D86.9     Clinically stable. F/up with rheumatology.            Neuro    Seronegative spondyloarthropathy M47.819     Clinically stable. F/up with rheumatology.            Pulmonary and Pneumonias    Asthma ([facility]) J45.909     Clinically stable. Will monitor.           It was a pleasure to see you today.  I would like to remind you about importance of a healthy lifestyle in order to improve your well-being and live longer.  Try to engage in physical activities for at least 150 minutes per week.  Eat about 10 servings of fruits and vegetables daily. My advice is 2 servings of fruits and 8 servings of vegetables.  For vegetables choose at least half of them green and at least half of them fresh.  Please avoid sugar, salt, fried food and saturated fat.    I spent a total of 32 minutes on the date of service for follow up visit, which included preparing to see the patient, face-to-face patient care, completing clinical documentation, obtaining and/or reviewing separately obtained history, performing a medically appropriate examination, counseling and educating the patient/family/caregiver, ordering medications, tests, or procedures, communicating with other health care providers (not separately reported), independently interpreting results (not separately reported), communicating results to the patient/family/caregiver, and care coordination (not separately reported).    F/up in 3 months or sooner if needed.

## 2024-07-05 ENCOUNTER — APPOINTMENT (OUTPATIENT)
Dept: RHEUMATOLOGY | Facility: CLINIC | Age: 54
End: 2024-07-05
Payer: COMMERCIAL

## 2024-07-05 LAB — S PYO THROAT QL CULT: NORMAL

## 2024-07-15 ENCOUNTER — LAB (OUTPATIENT)
Dept: LAB | Facility: LAB | Age: 54
End: 2024-07-15
Payer: COMMERCIAL

## 2024-07-15 ENCOUNTER — APPOINTMENT (OUTPATIENT)
Dept: OPHTHALMOLOGY | Facility: CLINIC | Age: 54
End: 2024-07-15
Payer: COMMERCIAL

## 2024-07-15 DIAGNOSIS — H20.9 IRITIS: Primary | ICD-10-CM

## 2024-07-15 DIAGNOSIS — H20.9 IRITIS: ICD-10-CM

## 2024-07-15 DIAGNOSIS — E11.9 TYPE 2 DIABETES MELLITUS WITHOUT COMPLICATION, WITHOUT LONG-TERM CURRENT USE OF INSULIN (MULTI): ICD-10-CM

## 2024-07-15 DIAGNOSIS — E11.3213 BOTH EYES AFFECTED BY MILD NONPROLIFERATIVE DIABETIC RETINOPATHY WITH MACULAR EDEMA, ASSOCIATED WITH TYPE 2 DIABETES MELLITUS (MULTI): ICD-10-CM

## 2024-07-15 PROCEDURE — 99214 OFFICE O/P EST MOD 30 MIN: CPT | Performed by: OPHTHALMOLOGY

## 2024-07-15 PROCEDURE — 83516 IMMUNOASSAY NONANTIBODY: CPT

## 2024-07-15 PROCEDURE — 4010F ACE/ARB THERAPY RXD/TAKEN: CPT | Performed by: OPHTHALMOLOGY

## 2024-07-15 PROCEDURE — 3008F BODY MASS INDEX DOCD: CPT | Performed by: OPHTHALMOLOGY

## 2024-07-15 PROCEDURE — 86695 HERPES SIMPLEX TYPE 1 TEST: CPT

## 2024-07-15 PROCEDURE — 86696 HERPES SIMPLEX TYPE 2 TEST: CPT

## 2024-07-15 PROCEDURE — 3061F NEG MICROALBUMINURIA REV: CPT | Performed by: OPHTHALMOLOGY

## 2024-07-15 PROCEDURE — 92134 CPTRZ OPH DX IMG PST SGM RTA: CPT | Mod: BILATERAL PROCEDURE | Performed by: OPHTHALMOLOGY

## 2024-07-15 PROCEDURE — 36415 COLL VENOUS BLD VENIPUNCTURE: CPT

## 2024-07-15 PROCEDURE — 86036 ANCA SCREEN EACH ANTIBODY: CPT

## 2024-07-15 PROCEDURE — 3046F HEMOGLOBIN A1C LEVEL >9.0%: CPT | Performed by: OPHTHALMOLOGY

## 2024-07-15 RX ORDER — PREDNISOLONE ACETATE 10 MG/ML
1 SUSPENSION/ DROPS OPHTHALMIC 4 TIMES DAILY
Qty: 5 ML | Refills: 0 | Status: SHIPPED | OUTPATIENT
Start: 2024-07-15 | End: 2024-07-29

## 2024-07-15 ASSESSMENT — CONF VISUAL FIELD
OS_INFERIOR_NASAL_RESTRICTION: 0
OS_SUPERIOR_NASAL_RESTRICTION: 0
OS_SUPERIOR_TEMPORAL_RESTRICTION: 0
OD_NORMAL: 1
OD_INFERIOR_TEMPORAL_RESTRICTION: 0
OD_SUPERIOR_NASAL_RESTRICTION: 0
OS_NORMAL: 1
OD_INFERIOR_NASAL_RESTRICTION: 0
OD_SUPERIOR_TEMPORAL_RESTRICTION: 0
OS_INFERIOR_TEMPORAL_RESTRICTION: 0

## 2024-07-15 ASSESSMENT — TONOMETRY
OS_IOP_MMHG: 17
IOP_METHOD: TONOPEN
OD_IOP_MMHG: 17

## 2024-07-15 ASSESSMENT — VISUAL ACUITY
METHOD: SNELLEN - LINEAR
OD_CC: 20/20-1
OS_CC: 20/20-2
CORRECTION_TYPE: GLASSES

## 2024-07-15 ASSESSMENT — CUP TO DISC RATIO
OD_RATIO: .2
OS_RATIO: .2

## 2024-07-15 ASSESSMENT — ENCOUNTER SYMPTOMS
MUSCULOSKELETAL NEGATIVE: 1
EYES NEGATIVE: 1

## 2024-07-15 ASSESSMENT — SLIT LAMP EXAM - LIDS
COMMENTS: GOOD POSITION
COMMENTS: GOOD POSITION

## 2024-07-15 ASSESSMENT — EXTERNAL EXAM - LEFT EYE: OS_EXAM: NORMAL

## 2024-07-15 ASSESSMENT — EXTERNAL EXAM - RIGHT EYE: OD_EXAM: NORMAL

## 2024-07-15 NOTE — PROGRESS NOTES
Assessment/Plan       Diagnoses and all orders for this visit:  Type 2 diabetes mellitus without complication, without long-term current use of insulin (Multi)  -     OCT, Retina - OU - Both Eyes  Iritis  -     OCT, Retina - OU - Both Eyes  Both eyes affected by mild nonproliferative diabetic retinopathy with macular edema, associated with type 2 diabetes mellitus (Multi)        Assessment/Plan   Diagnoses and all orders for this visit:  Presbyopia  New spec rx released today per patient request. Ocular health wnl for age OU. Monitor 1 year or sooner prn. Refraction billed today.    Seronegative spondyloarthropathy  Sarcoid  Iritis  -     prednisoLONE acetate (Pred-Forte) 1 % ophthalmic suspension; Administer 1 drop into both eyes 3 times a day for 14 days.  -     timolol (Timoptic) 0.5 % ophthalmic solution; Administer 1 drop into both eyes 2 times a day.  Discussed. Iritis reactivated today. Concern that PS is getting worse. Will consult Dr. Lowery to see if a synechialysis may be indicated.   Could not break in the office w/ mydriatics today.     Type 2 diabetes mellitus without complication, without long-term current use of insulin (Multi)  The patient has diabetes without any evidence of retinopathy.  The patient was advised to maintain tight glucose control, tight blood pressure control, and favorable levels of cholesterol, low density lipoprotein, and high density lipoproteins.  Follow up in one year was recommended.        She will needs an fluorescein angiography (FA)     [facility]    DIAGNOSTIC PROCEDURE DONE    OCT DONE OD/OS            REASON FOR TEST: will help address and tailor  therapy by detecting subclinical CME SRF     Hi quality OCT  scans obtained  signal good    OCT OD - Normal Foveal Contour, No Edema, IS/OS Junction Normal  OCT OS - Normal Foveal Contour, No Edema, IS/OS Junction Normal    additional commnents:      Fu for fluorescein angiography (FA)   For now PF BID T12 bid

## 2024-07-16 LAB
HERPES SIMPLEX VIRUS 1 IGG: >8 INDEX
HERPES SIMPLEX VIRUS 2 IGG: <0.2 INDEX

## 2024-07-18 LAB
ANCA AB PATTERN SER IF-IMP: NORMAL
ANCA IGG TITR SER IF: NORMAL {TITER}
MYELOPEROXIDASE AB SER-ACNC: 0 AU/ML (ref 0–19)
PROTEINASE3 AB SER-ACNC: 0 AU/ML (ref 0–19)

## 2024-07-20 DIAGNOSIS — H20.9 IRITIS: ICD-10-CM

## 2024-07-22 RX ORDER — TIMOLOL MALEATE 5 MG/ML
1 SOLUTION/ DROPS OPHTHALMIC 2 TIMES DAILY
Qty: 15 ML | Refills: 1 | Status: SHIPPED | OUTPATIENT
Start: 2024-07-22 | End: 2025-07-22

## 2024-08-12 ENCOUNTER — APPOINTMENT (OUTPATIENT)
Dept: OPHTHALMOLOGY | Facility: CLINIC | Age: 54
End: 2024-08-12
Payer: COMMERCIAL

## 2024-08-12 DIAGNOSIS — D86.9 SARCOID: ICD-10-CM

## 2024-08-12 DIAGNOSIS — D86.9 SARCOIDOSIS: Primary | ICD-10-CM

## 2024-08-12 DIAGNOSIS — E11.9 TYPE 2 DIABETES MELLITUS WITHOUT COMPLICATION, WITHOUT LONG-TERM CURRENT USE OF INSULIN (MULTI): ICD-10-CM

## 2024-08-12 DIAGNOSIS — E11.3293 MILD NONPROLIFERATIVE DIABETIC RETINOPATHY OF BOTH EYES WITHOUT MACULAR EDEMA ASSOCIATED WITH TYPE 2 DIABETES MELLITUS (MULTI): ICD-10-CM

## 2024-08-12 DIAGNOSIS — M47.819 SERONEGATIVE SPONDYLOARTHROPATHY: ICD-10-CM

## 2024-08-12 PROCEDURE — 92134 CPTRZ OPH DX IMG PST SGM RTA: CPT | Performed by: OPHTHALMOLOGY

## 2024-08-12 PROCEDURE — 99214 OFFICE O/P EST MOD 30 MIN: CPT | Performed by: OPHTHALMOLOGY

## 2024-08-12 PROCEDURE — 92235 FLUORESCEIN ANGRPH MLTIFRAME: CPT | Mod: LEFT SIDE | Performed by: OPHTHALMOLOGY

## 2024-08-12 RX ORDER — FLUORESCEIN 500 MG/ML
500 INJECTION INTRAVENOUS
Status: COMPLETED | OUTPATIENT
Start: 2024-08-12 | End: 2024-08-12

## 2024-08-12 ASSESSMENT — VISUAL ACUITY
CORRECTION_TYPE: GLASSES
OS_CC: 20/20
OD_CC: 20/20
METHOD: SNELLEN - LINEAR

## 2024-08-12 ASSESSMENT — SLIT LAMP EXAM - LIDS
COMMENTS: GOOD POSITION
COMMENTS: GOOD POSITION

## 2024-08-12 ASSESSMENT — TONOMETRY
OD_IOP_MMHG: 16
IOP_METHOD: GOLDMANN APPLANATION
OS_IOP_MMHG: 16

## 2024-08-12 ASSESSMENT — EXTERNAL EXAM - RIGHT EYE: OD_EXAM: NORMAL

## 2024-08-12 ASSESSMENT — CUP TO DISC RATIO
OD_RATIO: .2
OS_RATIO: .2

## 2024-08-12 ASSESSMENT — ENCOUNTER SYMPTOMS: EYES NEGATIVE: 1

## 2024-08-12 ASSESSMENT — EXTERNAL EXAM - LEFT EYE: OS_EXAM: NORMAL

## 2024-08-12 NOTE — PROGRESS NOTES
Assessment/Plan       Diagnoses and all orders for this visit:  Type 2 diabetes mellitus without complication, without long-term current use of insulin (Multi)  -     OCT, Retina - OU - Both Eyes  Mild nonproliferative diabetic retinopathy of both eyes without macular edema associated with type 2 diabetes mellitus (Multi)  Seronegative spondyloarthropathy  Sarcoid  Sarcoidosis        Assessment/Plan   Diagnoses and all orders for this visit:  Presbyopia  New spec rx released today per patient request. Ocular health wnl for age OU. Monitor 1 year or sooner prn. Refraction billed today.    Seronegative spondyloarthropathy  Sarcoid  Iritis  -     prednisoLONE acetate (Pred-Forte) 1 % ophthalmic suspension; Administer 1 drop into both eyes 3 times a day for 14 days.  -     timolol (Timoptic) 0.5 % ophthalmic solution; Administer 1 drop into both eyes 2 times a day.  Discussed. Iritis reactivated today. Concern that PS is getting worse. Will consult Dr. Lowery to see if a synechialysis may be indicated.   Could not break in the office w/ mydriatics today.     Type 2 diabetes mellitus without complication, without long-term current use of insulin (Multi)  The patient has diabetes without any evidence of retinopathy.  The patient was advised to maintain tight glucose control, tight blood pressure control, and favorable levels of cholesterol, low density lipoprotein, and high density lipoproteins.  Follow up in one year was recommended.        She will needs an fluorescein angiography (FA)     [facility]    DIAGNOSTIC PROCEDURE DONE    OCT DONE OD/OS            REASON FOR TEST: will help address and tailor  therapy by detecting subclinical CME SRF     Hi quality OCT  scans obtained  signal good    OCT OD - Normal Foveal Contour, No Edema, IS/OS Junction Normal  OCT OS - Normal Foveal Contour, No Edema, IS/OS Junction Normal    additional commnents:      Fu for fluorescein angiography (FA)   Reduce to every day PF  QDT12  bid    PROCEDURE: FUNDUS FLUORESCEIN ANGIOGRAPHY      After informed consent, fluorescein angiogram was performed      Site     Interpretation    Macula no leakage both eyes (OU)   Optic nerve well perfused with no leakage both eyes (OU)   Vessels normal in caliber with no leakage both eyes (OU)   MA ou no DME Ou no vascular leakage ou    Impression    The angiogram is consistent with the clinical finding mild non-proliferative diabetic retinopathy (NPDR) both eyes  No active uveitis posterior      2m

## 2024-08-16 ENCOUNTER — LAB (OUTPATIENT)
Dept: LAB | Facility: LAB | Age: 54
End: 2024-08-16
Payer: COMMERCIAL

## 2024-08-16 ENCOUNTER — APPOINTMENT (OUTPATIENT)
Dept: RHEUMATOLOGY | Facility: CLINIC | Age: 54
End: 2024-08-16
Payer: COMMERCIAL

## 2024-08-16 VITALS
TEMPERATURE: 97 F | BODY MASS INDEX: 40.74 KG/M2 | OXYGEN SATURATION: 98 % | SYSTOLIC BLOOD PRESSURE: 115 MMHG | HEIGHT: 64 IN | RESPIRATION RATE: 16 BRPM | HEART RATE: 88 BPM | DIASTOLIC BLOOD PRESSURE: 72 MMHG | WEIGHT: 238.6 LBS

## 2024-08-16 DIAGNOSIS — M75.01 ADHESIVE CAPSULITIS OF RIGHT SHOULDER: ICD-10-CM

## 2024-08-16 DIAGNOSIS — D86.9 SARCOIDOSIS: Primary | ICD-10-CM

## 2024-08-16 DIAGNOSIS — D86.9 SARCOIDOSIS: ICD-10-CM

## 2024-08-16 DIAGNOSIS — H20.9 IRITIS: ICD-10-CM

## 2024-08-16 DIAGNOSIS — R82.90 CLOUDY URINE: ICD-10-CM

## 2024-08-16 PROBLEM — J06.9 ACUTE URI: Status: RESOLVED | Noted: 2023-10-26 | Resolved: 2024-08-16

## 2024-08-16 PROBLEM — J02.9 PHARYNGITIS: Status: RESOLVED | Noted: 2023-10-26 | Resolved: 2024-08-16

## 2024-08-16 PROBLEM — J01.10 ACUTE FRONTAL SINUSITIS: Status: RESOLVED | Noted: 2023-10-26 | Resolved: 2024-08-16

## 2024-08-16 LAB
ALBUMIN SERPL BCP-MCNC: 4.2 G/DL (ref 3.4–5)
ALP SERPL-CCNC: 88 U/L (ref 33–110)
ALT SERPL W P-5'-P-CCNC: 76 U/L (ref 7–45)
ANION GAP SERPL CALC-SCNC: 16 MMOL/L (ref 10–20)
AST SERPL W P-5'-P-CCNC: 34 U/L (ref 9–39)
BASOPHILS # BLD AUTO: 0.1 X10*3/UL (ref 0–0.1)
BASOPHILS NFR BLD AUTO: 1.1 %
BILIRUB SERPL-MCNC: 0.4 MG/DL (ref 0–1.2)
BUN SERPL-MCNC: 16 MG/DL (ref 6–23)
CALCIUM SERPL-MCNC: 9.4 MG/DL (ref 8.6–10.6)
CHLORIDE SERPL-SCNC: 102 MMOL/L (ref 98–107)
CO2 SERPL-SCNC: 24 MMOL/L (ref 21–32)
CREAT SERPL-MCNC: 0.52 MG/DL (ref 0.5–1.05)
EGFRCR SERPLBLD CKD-EPI 2021: >90 ML/MIN/1.73M*2
EOSINOPHIL # BLD AUTO: 0.26 X10*3/UL (ref 0–0.7)
EOSINOPHIL NFR BLD AUTO: 3 %
ERYTHROCYTE [DISTWIDTH] IN BLOOD BY AUTOMATED COUNT: 13.2 % (ref 11.5–14.5)
ERYTHROCYTE [SEDIMENTATION RATE] IN BLOOD BY WESTERGREN METHOD: 18 MM/H (ref 0–30)
GLUCOSE SERPL-MCNC: 209 MG/DL (ref 74–99)
HCT VFR BLD AUTO: 39.6 % (ref 36–46)
HGB BLD-MCNC: 12.7 G/DL (ref 12–16)
IMM GRANULOCYTES # BLD AUTO: 0.04 X10*3/UL (ref 0–0.7)
IMM GRANULOCYTES NFR BLD AUTO: 0.5 % (ref 0–0.9)
LYMPHOCYTES # BLD AUTO: 2.41 X10*3/UL (ref 1.2–4.8)
LYMPHOCYTES NFR BLD AUTO: 27.4 %
MCH RBC QN AUTO: 27.8 PG (ref 26–34)
MCHC RBC AUTO-ENTMCNC: 32.1 G/DL (ref 32–36)
MCV RBC AUTO: 87 FL (ref 80–100)
MONOCYTES # BLD AUTO: 0.64 X10*3/UL (ref 0.1–1)
MONOCYTES NFR BLD AUTO: 7.3 %
NEUTROPHILS # BLD AUTO: 5.34 X10*3/UL (ref 1.2–7.7)
NEUTROPHILS NFR BLD AUTO: 60.7 %
NRBC BLD-RTO: 0 /100 WBCS (ref 0–0)
PLATELET # BLD AUTO: 263 X10*3/UL (ref 150–450)
POC APPEARANCE, URINE: CLEAR
POC BILIRUBIN, URINE: NEGATIVE
POC BLOOD, URINE: NEGATIVE
POC COLOR, URINE: YELLOW
POC GLUCOSE, URINE: NEGATIVE MG/DL
POC KETONES, URINE: ABNORMAL MG/DL
POC LEUKOCYTES, URINE: ABNORMAL
POC NITRITE,URINE: NEGATIVE
POC PH, URINE: 5.5 PH
POC PROTEIN, URINE: ABNORMAL MG/DL
POC SPECIFIC GRAVITY, URINE: 1.02
POC UROBILINOGEN, URINE: 0.2 EU/DL
POTASSIUM SERPL-SCNC: 4.6 MMOL/L (ref 3.5–5.3)
PROT SERPL-MCNC: 7.7 G/DL (ref 6.4–8.2)
RBC # BLD AUTO: 4.57 X10*6/UL (ref 4–5.2)
SODIUM SERPL-SCNC: 137 MMOL/L (ref 136–145)
WBC # BLD AUTO: 8.8 X10*3/UL (ref 4.4–11.3)

## 2024-08-16 PROCEDURE — 3061F NEG MICROALBUMINURIA REV: CPT | Performed by: INTERNAL MEDICINE

## 2024-08-16 PROCEDURE — 99214 OFFICE O/P EST MOD 30 MIN: CPT | Performed by: INTERNAL MEDICINE

## 2024-08-16 PROCEDURE — 3008F BODY MASS INDEX DOCD: CPT | Performed by: INTERNAL MEDICINE

## 2024-08-16 PROCEDURE — 80053 COMPREHEN METABOLIC PANEL: CPT

## 2024-08-16 PROCEDURE — 81003 URINALYSIS AUTO W/O SCOPE: CPT | Performed by: INTERNAL MEDICINE

## 2024-08-16 PROCEDURE — 3078F DIAST BP <80 MM HG: CPT | Performed by: INTERNAL MEDICINE

## 2024-08-16 PROCEDURE — 85025 COMPLETE CBC W/AUTO DIFF WBC: CPT

## 2024-08-16 PROCEDURE — 4010F ACE/ARB THERAPY RXD/TAKEN: CPT | Performed by: INTERNAL MEDICINE

## 2024-08-16 PROCEDURE — 3046F HEMOGLOBIN A1C LEVEL >9.0%: CPT | Performed by: INTERNAL MEDICINE

## 2024-08-16 PROCEDURE — 3074F SYST BP LT 130 MM HG: CPT | Performed by: INTERNAL MEDICINE

## 2024-08-16 PROCEDURE — 36415 COLL VENOUS BLD VENIPUNCTURE: CPT

## 2024-08-16 PROCEDURE — 85652 RBC SED RATE AUTOMATED: CPT

## 2024-08-16 ASSESSMENT — PATIENT HEALTH QUESTIONNAIRE - PHQ9
2. FEELING DOWN, DEPRESSED OR HOPELESS: NOT AT ALL
SUM OF ALL RESPONSES TO PHQ9 QUESTIONS 1 AND 2: 0
1. LITTLE INTEREST OR PLEASURE IN DOING THINGS: NOT AT ALL

## 2024-08-16 NOTE — PROGRESS NOTES
Subjective   Patient ID: Daniela Neri is a 53 y.o. female who presents for follow-up regarding sarcoidosis.    HPI 53-year-old female here for f/u re: sarcoidosis. This has involved her liver, skin of her scalp, right breast, and retroperitoneal lymph nodes. She also had hypercalcemia and abdominal pain November 2020. (calcium 14.4)  She has been on a gradual prednisone taper.  Prednisone was reduced to 2.5 mg daily December 2022.,  Prednisone was stopped 3/23.     She has not had any recurrence of her rash.    She was diagnosed with recurrent left eye anterior uveitis.  She initially saw Dr. Paz, that was seeing Dr. Lowery.  She is being treated with prednisolone 1% 1 gtt both eyes 3x daily for 14 days.  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    Prior to this, last episode of iritis was 2005.     She otherwise overall feels well.  She denies rashes, joint pain, cough or shortness of breath.     Following is a summary of biopsies:   -Right breast biopsy 3/20: Nonnecrotizing granulomatous inflammation with giant cell reaction.   -Scalp biopsy November 2020: Granulomatous inflammation.   -Liver biopsy October 2020: Nonnecrotizing granulomas with associated fibrosis, mild portal inflammation with preserved bile ducts   -Retroperitoneal lymph node biopsy September 2020: Nonnecrotizing granulomas. Stains for AFB and fungus were negative.     The patient first recalls being hospitalized in the sixth grade when she had mononucleosis. She actually had a bone marrow biopsy at that time, and required IV feedings for a period of time.     She developed symptoms of joint pain when she was in the ninth grade. Sometimes she had neck pain or swollen toe or finger.     She had several episodes of iritis when she was in her 30s, but has not had iritis since her 30s. (Last episode 2005).    Right shoulder adhesive capsulitis has been improving with swimming.     She developed a right facial weakness 2001, that was  diagnosed by a neurologist as post viral polyradiculopathy. She had extensive evaluation at that time, including MRI of the brain. She still has right facial weakness.     She was seen by me for several years with episodic back pain and asymmetric peripheral joint pain. 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 . She has not had much joint pain for the last 15 to 20 years.     She was seen by me November 2019 because of polyclonal gammopathy. She had a mildly elevated alkaline phosphatase of 184, with mildly elevated liver transaminases. Testing for hepatitis A, B and C was negative. Labs were done at that time which showed a normal serum ACE level, negative HLA-B27, negative citrulline antibody, negative rheumatoid factor, negative antimitochondrial antibody, negative smooth muscle antibody, negative Lyme JUAN CARLOS.     She has also had issues with her scalp over the last 2 years, for which she has been seeing Dr. Garcia in dermatology. At 1 point this was thought to possibly be psoriasis. She was treated with ketoconazole shampoo and fluocinonide lotion. Dr. Garcia did a biopsy of her scalp November 2020, there was interpreted as a granulomatous dermatitis.     The patient also had a breast biopsy done March 2020 because of a mass found in her breast. Pathology report was consistent with nonnecrotizing granulomatous inflammation with giant cell reaction.     The patient also developed some abdominal pain August 2020, along with increasing liver enzymes. CT of the abdomen and done August 2020 showed marked splenomegaly, enlarged liver with lobulated lobulated contour, moderate mesenteric and retroperitoneal lymphadenopathy, and wall thickening of the proximal duodenum.     Duodenal biopsy done December 2020 was unremarkable. Gastric biopsy was unremarkable. Colon biopsy was unremarkable. Liver biopsy done October 2020 showed well-formed nonnecrotizing  "granulomas with associated fibrosis, mild portal inflammation with preserved bile ducts, and no fibrosis otherwise demonstrated.     Retroperitoneal lymph node biopsy done September 2020 showed nonnecrotizing granulomas involving fibrous and lymphoid tissue. Stains for AFB and fungus were negative.     The patient was hospitalized November 19, 2020 through November 22, 2020 with nausea and vomiting. She was also found to have hypercalcemia, calcium 14.4. Her alkaline phosphatase had increased as high as 475 June 2020. She was started on prednisone 40 mg daily early 12/20 for presumptive diagnosis of sarcoid.  Prednisone was tapered to 20 mg daily 3/02/21.    Other specialists involved in her care include Dr. Almaraz (hepatology) and Dr. Gutierrez (hematology), as well as Dr. Garcia in dermatology.  They agree the diagnosis of sarcoidosis.     DEXA July 2021 was normal.     Labs 2/22: CMP normal except glucose 161 and ALT 47, 25-hydroxy vitamin D 48, CBC normal, hemoglobin A1c 9.4  Labs October 2022: CBC normal, CMP normal except sodium 134 and glucose 220, hemoglobin A1c 9.0, cholesterol 188, HDL 33, triglycerides 805  Labs 2/23: CMP normal except glucose 168, ESR 29  Labs July 2023: CMP normal except glucose 124 and hemoglobin A1c 7.8  Labs 1/24: CMP normal excepty glucose 203, AST 54 (9-39), ALT 68 (7-45)  Labs 6/24: Hemoglobin A1c 10.6, TSH 1.9, 25-hydroxy vitamin D 42  Labs July 2024: ANCA negative, urine albumin to creatinine ratio 19.7     Medical problem list:   - h/o seronegative spondyloarthropathy (started 9th grade- had neck stiffness, \"sausage digits\" intermittently.   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  Surgeries: none.     Social history: , no children.   Quit smoking 2009.   Occupation: .     Family history: Mother- ITP   No family h/o ankylosing spondylitis, psoriasis, ulcerative colitis, Crohn's disease.    Health maintenance:  Flu shot November 9, 2023  Pfizer COVID-vaccine November 9, 2023          ROS:  General: Denies fevers or chills.  CV: Denies chest pain or palpitations.  Denies leg edema.  Lungs: Denies coughing or shortness of breath.  Skin: Denies rashes or nodules.  MS: Denies joint pain or joint swelling.     Objective   /72 (BP Location: Left arm, Patient Position: Sitting, BP Cuff Size: Adult)   Pulse 88   Temp 36.1 °C (97 °F) (Skin)   Resp 16   Ht 1.626 m (5' 4\")   Wt 108 kg (238 lb 9.6 oz)   SpO2 98%   BMI 40.96 kg/m²     Physical Exam  HEENT: PERRL, EOMI  Neck: Supple, no nodes.  CV: RRR, no MGR.  Lungs: Clear, no rales or wheezes.  Abdomen: Soft, nontender. No hepatosplenomegaly.  Extremities:  No cyanosis, clubbing, or edema.  MS: No synovitis.  Right shoulder without warmth or effusion.  Acromioclavicular and sternoclavicular joints nontender.  Bicipital tendon nontender.  She is now able to raise her arm overhead.  She has pain with abduction greater than 90 degrees.  She has limited extension and internal rotation-can internally rotate to L3.  Skin: No rashes or nodules.      Assessment/Plan   Problem List Items Addressed This Visit             ICD-10-CM    Iritis H20.9    Adhesive capsulitis M75.00    Sarcoidosis - Primary D86.9     1. Sarcoidosis- manifested by elevated liver enzymes (especially alkaline phosphatase), hypercalcemia, rash in scalp, " hepatosplenomegaly with retroperitoneal lymphadenopathy, history of iritis in her 30s.  Now has a recurrence of anterior uveitis, left eye more than the right eye.  She is currently on prednisone 1% eyedrops 1 drop 3 times daily to both eyes.  She is under the care of Dr. Lowery.     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 She also had a facial weakness 2001 (mostly on the right side)-this was diagnosed by her neurologist as being post viral polyradiculopathy after an extensive evaluation, including MRI of the brain. I suspect that this may have been a Bell's palsy related to sarcoid as well.     Confirmatory biopsies include breast biopsy March 2020, scalp biopsy November 2020, liver biopsy October 2020, retroperitoneal lymph node biopsy September 2020. T spot 9/20 was negative. Stains for AFB and fungus have been negative.     Her predominant manifestations that required treatment in thre past were hypercalcemia and abdominal pain.  Her calcium is currently normal and she is not having abdominal pain.     Prednisone was stopped March 2023 with no recurrence of her previous symptoms.    2. Type 2 diabetes-she will continue follow-up with Kiera Tariq for management. Hemoglobin A1c is currently 7.8 July 2023. Starting mounjaro.     3. BMI 40-stable, will continue to work on weight loss. Also starting Mounjaro.    4.  Adhesive capsulitis right shoulder-doing better with swimming.    5. Cloudy urine- off and on for a few days. Denies dysuria or frequency. UA shows trace leuk esterace- not convincing for UTI.     Plan:  Check CBC with differential, CMP, ESR.  Follow-up in 3 months.  If she continues to get episodes of uveitis, can consider other steroid sparing therapy (ie infliximab or  adalimumab).

## 2024-08-17 RX ORDER — PREDNISOLONE ACETATE 10 MG/ML
1 SUSPENSION/ DROPS OPHTHALMIC 3 TIMES DAILY
COMMUNITY

## 2024-08-17 NOTE — PATIENT INSTRUCTIONS
Check CBC with differential, CMP, ESR.  Follow-up in 3 months.  If she continues to get episodes of uveitis, can consider other steroid sparing therapy (ie infliximab or adalimumab).

## 2024-08-21 ENCOUNTER — OFFICE VISIT (OUTPATIENT)
Dept: PRIMARY CARE | Facility: CLINIC | Age: 54
End: 2024-08-21
Payer: COMMERCIAL

## 2024-08-21 VITALS
WEIGHT: 240.2 LBS | HEIGHT: 64 IN | BODY MASS INDEX: 41.01 KG/M2 | OXYGEN SATURATION: 99 % | SYSTOLIC BLOOD PRESSURE: 108 MMHG | DIASTOLIC BLOOD PRESSURE: 72 MMHG | HEART RATE: 67 BPM

## 2024-08-21 DIAGNOSIS — R39.9 UTI SYMPTOMS: ICD-10-CM

## 2024-08-21 LAB
POC APPEARANCE, URINE: ABNORMAL
POC BILIRUBIN, URINE: NEGATIVE
POC BLOOD, URINE: ABNORMAL
POC COLOR, URINE: YELLOW
POC GLUCOSE, URINE: NEGATIVE MG/DL
POC KETONES, URINE: NEGATIVE MG/DL
POC LEUKOCYTES, URINE: ABNORMAL
POC NITRITE,URINE: POSITIVE
POC PH, URINE: 6 PH
POC PROTEIN, URINE: ABNORMAL MG/DL
POC SPECIFIC GRAVITY, URINE: 1.02
POC UROBILINOGEN, URINE: 0.2 EU/DL

## 2024-08-21 PROCEDURE — 1036F TOBACCO NON-USER: CPT | Performed by: NURSE PRACTITIONER

## 2024-08-21 PROCEDURE — 4010F ACE/ARB THERAPY RXD/TAKEN: CPT | Performed by: NURSE PRACTITIONER

## 2024-08-21 PROCEDURE — 99213 OFFICE O/P EST LOW 20 MIN: CPT | Performed by: NURSE PRACTITIONER

## 2024-08-21 PROCEDURE — 87086 URINE CULTURE/COLONY COUNT: CPT

## 2024-08-21 PROCEDURE — 3046F HEMOGLOBIN A1C LEVEL >9.0%: CPT | Performed by: NURSE PRACTITIONER

## 2024-08-21 PROCEDURE — 3061F NEG MICROALBUMINURIA REV: CPT | Performed by: NURSE PRACTITIONER

## 2024-08-21 PROCEDURE — 87186 SC STD MICRODIL/AGAR DIL: CPT

## 2024-08-21 PROCEDURE — 81002 URINALYSIS NONAUTO W/O SCOPE: CPT | Performed by: NURSE PRACTITIONER

## 2024-08-21 PROCEDURE — 3008F BODY MASS INDEX DOCD: CPT | Performed by: NURSE PRACTITIONER

## 2024-08-21 PROCEDURE — 3074F SYST BP LT 130 MM HG: CPT | Performed by: NURSE PRACTITIONER

## 2024-08-21 PROCEDURE — 3078F DIAST BP <80 MM HG: CPT | Performed by: NURSE PRACTITIONER

## 2024-08-21 RX ORDER — NITROFURANTOIN 25; 75 MG/1; MG/1
100 CAPSULE ORAL 2 TIMES DAILY
Qty: 14 CAPSULE | Refills: 0 | Status: SHIPPED | OUTPATIENT
Start: 2024-08-21 | End: 2024-08-28

## 2024-08-21 ASSESSMENT — PATIENT HEALTH QUESTIONNAIRE - PHQ9
1. LITTLE INTEREST OR PLEASURE IN DOING THINGS: NOT AT ALL
SUM OF ALL RESPONSES TO PHQ9 QUESTIONS 1 AND 2: 0
2. FEELING DOWN, DEPRESSED OR HOPELESS: NOT AT ALL

## 2024-08-21 NOTE — PROGRESS NOTES
"Subjective   Patient ID: Daniela Neri is a 53 y.o. female who presents for UTI symptoms (Current symptoms include Odor, frequency and cloudy. Onset about 3 days ago. ).    Symptoms started Monday night with cloudiness and odor.   Started 2 pills of amoxicillin a few weeks ago when she got ymptoms iniitally       Review of Systems  otherwise negative aside from what was mentioned above in HPI.    Objective   /72 (BP Location: Left arm, Patient Position: Sitting, BP Cuff Size: Adult)   Pulse 67   Ht 1.626 m (5' 4\")   Wt 109 kg (240 lb 3.2 oz)   SpO2 99%   BMI 41.23 kg/m²     Physical Exam  Constitutional:       Appearance: Normal appearance. She is normal weight.   Eyes:      Conjunctiva/sclera: Conjunctivae normal.   Cardiovascular:      Rate and Rhythm: Normal rate and regular rhythm.   Pulmonary:      Effort: Pulmonary effort is normal.      Breath sounds: Normal breath sounds.   Abdominal:      General: Bowel sounds are normal.      Palpations: Abdomen is soft.      Tenderness: There is no right CVA tenderness or left CVA tenderness.   Neurological:      General: No focal deficit present.      Mental Status: She is alert.   Psychiatric:         Mood and Affect: Mood normal.         Thought Content: Thought content normal.         Assessment/Plan   Problem List Items Addressed This Visit    None  Visit Diagnoses         Codes    UTI symptoms     R39.9    Relevant Medications    nitrofurantoin, macrocrystal-monohydrate, (Macrobid) 100 mg capsule    Other Relevant Orders    POCT UA (nonautomated) manually resulted (Completed)    Urine Culture               "

## 2024-08-25 LAB — BACTERIA UR CULT: ABNORMAL

## 2024-08-28 ENCOUNTER — APPOINTMENT (OUTPATIENT)
Dept: PRIMARY CARE | Facility: CLINIC | Age: 54
End: 2024-08-28
Payer: COMMERCIAL

## 2024-08-28 VITALS
SYSTOLIC BLOOD PRESSURE: 122 MMHG | WEIGHT: 237.8 LBS | OXYGEN SATURATION: 99 % | BODY MASS INDEX: 40.82 KG/M2 | RESPIRATION RATE: 16 BRPM | TEMPERATURE: 98 F | DIASTOLIC BLOOD PRESSURE: 70 MMHG | HEART RATE: 85 BPM

## 2024-08-28 DIAGNOSIS — M47.819 SERONEGATIVE SPONDYLOARTHROPATHY: ICD-10-CM

## 2024-08-28 DIAGNOSIS — Z79.4 TYPE 2 DIABETES MELLITUS WITHOUT COMPLICATION, WITH LONG-TERM CURRENT USE OF INSULIN (MULTI): Primary | ICD-10-CM

## 2024-08-28 DIAGNOSIS — E11.9 TYPE 2 DIABETES MELLITUS WITHOUT COMPLICATION, WITH LONG-TERM CURRENT USE OF INSULIN (MULTI): Primary | ICD-10-CM

## 2024-08-28 DIAGNOSIS — D86.9 SARCOIDOSIS: ICD-10-CM

## 2024-08-28 DIAGNOSIS — I10 PRIMARY HYPERTENSION: ICD-10-CM

## 2024-08-28 DIAGNOSIS — F41.9 ANXIETY: ICD-10-CM

## 2024-08-28 DIAGNOSIS — J45.20 MILD INTERMITTENT ASTHMA, UNSPECIFIED WHETHER COMPLICATED (HHS-HCC): ICD-10-CM

## 2024-08-28 DIAGNOSIS — Z23 NEED FOR INFLUENZA VACCINATION: ICD-10-CM

## 2024-08-28 DIAGNOSIS — E11.3293 MILD NONPROLIFERATIVE DIABETIC RETINOPATHY OF BOTH EYES WITHOUT MACULAR EDEMA ASSOCIATED WITH TYPE 2 DIABETES MELLITUS (MULTI): ICD-10-CM

## 2024-08-28 PROCEDURE — 3061F NEG MICROALBUMINURIA REV: CPT | Performed by: INTERNAL MEDICINE

## 2024-08-28 PROCEDURE — 3074F SYST BP LT 130 MM HG: CPT | Performed by: INTERNAL MEDICINE

## 2024-08-28 PROCEDURE — 3046F HEMOGLOBIN A1C LEVEL >9.0%: CPT | Performed by: INTERNAL MEDICINE

## 2024-08-28 PROCEDURE — 1036F TOBACCO NON-USER: CPT | Performed by: INTERNAL MEDICINE

## 2024-08-28 PROCEDURE — 99214 OFFICE O/P EST MOD 30 MIN: CPT | Performed by: INTERNAL MEDICINE

## 2024-08-28 PROCEDURE — 90656 IIV3 VACC NO PRSV 0.5 ML IM: CPT | Performed by: INTERNAL MEDICINE

## 2024-08-28 PROCEDURE — 3078F DIAST BP <80 MM HG: CPT | Performed by: INTERNAL MEDICINE

## 2024-08-28 PROCEDURE — 4010F ACE/ARB THERAPY RXD/TAKEN: CPT | Performed by: INTERNAL MEDICINE

## 2024-08-28 PROCEDURE — 90471 IMMUNIZATION ADMIN: CPT | Performed by: INTERNAL MEDICINE

## 2024-08-28 RX ORDER — TIRZEPATIDE 5 MG/.5ML
5 INJECTION, SOLUTION SUBCUTANEOUS WEEKLY
Qty: 2 ML | Refills: 0 | Status: SHIPPED | OUTPATIENT
Start: 2024-08-28

## 2024-08-28 RX ORDER — PAROXETINE 10 MG/1
10 TABLET, FILM COATED ORAL DAILY
Qty: 90 TABLET | Refills: 3 | Status: SHIPPED | OUTPATIENT
Start: 2024-08-28

## 2024-08-28 ASSESSMENT — ENCOUNTER SYMPTOMS
NAUSEA: 0
NECK STIFFNESS: 0
NUMBNESS: 0
EYES NEGATIVE: 1
EYE PAIN: 0
ARTHRALGIAS: 1
POLYPHAGIA: 0
POLYDIPSIA: 0
FLANK PAIN: 0
SINUS PRESSURE: 0
NEUROLOGICAL NEGATIVE: 1
ADENOPATHY: 0
ENDOCRINE NEGATIVE: 1
SPEECH DIFFICULTY: 0
JOINT SWELLING: 0
WEAKNESS: 0
FACIAL ASYMMETRY: 0
PSYCHIATRIC NEGATIVE: 1
VOMITING: 0
RESPIRATORY NEGATIVE: 1
BRUISES/BLEEDS EASILY: 0
SEIZURES: 0
BACK PAIN: 0
WOUND: 0
ABDOMINAL PAIN: 0
MYALGIAS: 0
TROUBLE SWALLOWING: 0
HALLUCINATIONS: 0
AGITATION: 0
SORE THROAT: 0
WHEEZING: 0
APPETITE CHANGE: 0
COUGH: 0
CHEST TIGHTNESS: 0
COLOR CHANGE: 0
DIFFICULTY URINATING: 0
SHORTNESS OF BREATH: 0
NECK PAIN: 0
SLEEP DISTURBANCE: 0
SINUS PAIN: 0
EYE DISCHARGE: 0
FREQUENCY: 0
NERVOUS/ANXIOUS: 0
UNEXPECTED WEIGHT CHANGE: 0
VOICE CHANGE: 0
HEMATOLOGIC/LYMPHATIC NEGATIVE: 1
DYSURIA: 0
CARDIOVASCULAR NEGATIVE: 1
LIGHT-HEADEDNESS: 0
TREMORS: 0
DIARRHEA: 0
ACTIVITY CHANGE: 0
DIZZINESS: 0
CONFUSION: 0
GASTROINTESTINAL NEGATIVE: 1
BLOOD IN STOOL: 0
CONSTITUTIONAL NEGATIVE: 1
EYE REDNESS: 0
CONSTIPATION: 0
STRIDOR: 0
ALLERGIC/IMMUNOLOGIC NEGATIVE: 1
HEADACHES: 0
PALPITATIONS: 0

## 2024-08-28 NOTE — PROGRESS NOTES
Subjective   Patient ID: Daniela Neri is a 53 y.o. female who presents for Follow-up (Pt is here due to a follow up on medication ).  HPI    This is 54 yo female with very complex medical history including DM (uncontrolled), HTN, HLD, obesity, fatty liver, asthma, seronegative spondyloarthropathy, sarcoidosis, anxiety.      Patient has been feeling pretty good and has been complaint with prescribed medications.    We reviewed and discussed details of recent blood work: CBC, CMP, TSH,  Hb A1c done in June and August 2024. Results within acceptable range except elevated glucose, Hba1c: 10.6, mildly elevated ALT.    She was recently treated for UTI, sx improved.    She established with endocrinology MIGUEL Tariq and her medications have been adjusted.  Will see new endocrinologist in October.   Patient started Mounjaro in June 2024 and has been tolerating it well.  No side effects noted.  Needs refills, has been without it for 3 weeks due to Kiera Tariq transfer to inpatient service. Requests that I refill her medication.  She is also taking insulin, Glipizide and Metformin.      She has been following with Dr. Olmos regarding sarcoidosis and dose of prednisone was gradually reduced, then weaned off.      She was [facility] at [facility] ER in October 2021with abdominal pain. Diagnosed with colitis, symptoms improved after tx with antibiotic. CT showed thickened wall of abdomen.     Her last colonoscopy was in 2020 and normal, next screening colonoscopy[y advised in 2030.    Review of Systems   Constitutional: Negative.  Negative for activity change, appetite change and unexpected weight change.   HENT: Negative.  Negative for congestion, ear discharge, ear pain, hearing loss, mouth sores, nosebleeds, sinus pressure, sinus pain, sore throat, trouble swallowing and voice change.    Eyes: Negative.  Negative for pain, discharge, redness and visual disturbance.   Respiratory: Negative.  Negative for cough, chest tightness,  shortness of breath, wheezing and stridor.    Cardiovascular: Negative.  Negative for chest pain, palpitations and leg swelling.   Gastrointestinal: Negative.  Negative for abdominal pain, blood in stool, constipation, diarrhea, nausea and vomiting.   Endocrine: Negative.  Negative for polydipsia, polyphagia and polyuria.   Genitourinary: Negative.  Negative for difficulty urinating, dysuria, flank pain, frequency and urgency.   Musculoskeletal:  Positive for arthralgias. Negative for back pain, gait problem, joint swelling, myalgias, neck pain and neck stiffness.   Skin: Negative.  Negative for color change, rash and wound.   Allergic/Immunologic: Negative.  Negative for environmental allergies, food allergies and immunocompromised state.   Neurological: Negative.  Negative for dizziness, tremors, seizures, syncope, facial asymmetry, speech difficulty, weakness, light-headedness, numbness and headaches.   Hematological: Negative.  Negative for adenopathy. Does not bruise/bleed easily.   Psychiatric/Behavioral: Negative.  Negative for agitation, behavioral problems, confusion, hallucinations, sleep disturbance and suicidal ideas. The patient is not nervous/anxious.    All other systems reviewed and are negative.      Objective     Review of systems was performed and all systems were negative except what in HPI    /70 (BP Location: Left arm, Patient Position: Sitting, BP Cuff Size: Large adult)   Pulse 85   Temp 36.7 °C (98 °F) (Temporal)   Resp 16   Wt 108 kg (237 lb 12.8 oz)   SpO2 99%   BMI 40.82 kg/m²    Physical Exam  Vitals and nursing note reviewed.   Constitutional:       General: She is not in acute distress.     Appearance: Normal appearance.   HENT:      Head: Normocephalic and atraumatic.      Right Ear: External ear normal.      Left Ear: External ear normal.      Nose: Nose normal. No congestion or rhinorrhea.   Eyes:      General:         Right eye: No discharge.         Left eye: No  discharge.      Extraocular Movements: Extraocular movements intact.      Conjunctiva/sclera: Conjunctivae normal.      Pupils: Pupils are equal, round, and reactive to light.   Cardiovascular:      Rate and Rhythm: Normal rate and regular rhythm.      Pulses: Normal pulses.      Heart sounds: Normal heart sounds. No murmur heard.     No friction rub. No gallop.   Pulmonary:      Effort: Pulmonary effort is normal. No respiratory distress.      Breath sounds: Normal breath sounds. No stridor. No wheezing, rhonchi or rales.   Chest:      Chest wall: No tenderness.   Abdominal:      General: Bowel sounds are normal.      Palpations: Abdomen is soft. There is no mass.      Tenderness: There is no abdominal tenderness. There is no guarding or rebound.   Musculoskeletal:         General: No swelling or deformity. Normal range of motion.      Cervical back: Normal range of motion and neck supple. No rigidity or tenderness.      Right lower leg: No edema.      Left lower leg: No edema.   Lymphadenopathy:      Cervical: No cervical adenopathy.   Skin:     General: Skin is warm and dry.      Coloration: Skin is not jaundiced.      Findings: No bruising or erythema.   Neurological:      General: No focal deficit present.      Mental Status: She is alert and oriented to person, place, and time. Mental status is at baseline.      Cranial Nerves: No cranial nerve deficit.      Motor: No weakness.      Coordination: Coordination normal.      Gait: Gait normal.   Psychiatric:         Mood and Affect: Mood normal.         Behavior: Behavior normal.         Thought Content: Thought content normal.         Judgment: Judgment normal.         Assessment/Plan   Problem List Items Addressed This Visit             ICD-10-CM       Cardiac and Vasculature    Hypertension I10     Continue current treatment.             Endocrine/Metabolic    Type 2 diabetes mellitus without complications (Multi) - Primary E11.9     Continue current  treatment. F/up with endocrinology. Consult [facility] Pharmacist.          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 F/up with ophthalmology.             Mental Health    Anxiety F41.9    Relevant Medications    PARoxetine (Paxil) 10 mg tablet       Multi-system (Lupus, Sarcoid)    Sarcoidosis D86.9     Clinically stable. F/up with rheumatology.             Neuro    Seronegative spondyloarthropathy M47.819     Clinically stable. F/up with rheumatology.            Pulmonary and Pneumonias    Asthma ([facility]) J45.909     Clinically stable. Will monitor.           Other Visit Diagnoses         Codes    Need for influenza vaccination     Z23    Relevant Orders    Flu vaccine, trivalent, preservative free, age 6 months and greater (Fluraix/Fluzone/Flulaval) (Completed)        It was a pleasure to see you today.  I would like to remind you about importance of a healthy lifestyle in order to improve your well-being and live longer.  Try to engage in physical activities for at least 150 minutes per week.  Eat about 10 servings of fruits and vegetables daily. My advice is 2 servings of fruits and 8 servings of vegetables.  For vegetables choose at least half of them green and at least half of them fresh.  Please avoid sugar, salt, fried food and saturated fat.    I spent a total of 35 minutes on the date of service for follow up visit, which included preparing to see the patient, face-to-face patient care, completing clinical documentation, obtaining and/or reviewing separately obtained history, performing a medically appropriate examination, counseling and educating the patient/family/caregiver, ordering medications, tests, or procedures, communicating with other health care  providers (not separately reported), independently interpreting results (not separately reported), communicating results to the patient/family/caregiver, and care coordination (not separately reported).    F/up in 3 months or sooner if needed.

## 2024-08-29 DIAGNOSIS — H20.9 UNSPECIFIED IRIDOCYCLITIS: ICD-10-CM

## 2024-08-29 RX ORDER — PREDNISOLONE ACETATE 10 MG/ML
SUSPENSION/ DROPS OPHTHALMIC
Qty: 15 ML | Refills: 2 | Status: SHIPPED | OUTPATIENT
Start: 2024-08-29 | End: 2024-09-28

## 2024-09-03 DIAGNOSIS — N39.0 URINARY TRACT INFECTION WITHOUT HEMATURIA, SITE UNSPECIFIED: Primary | ICD-10-CM

## 2024-09-07 ENCOUNTER — LAB (OUTPATIENT)
Dept: LAB | Facility: LAB | Age: 54
End: 2024-09-07
Payer: COMMERCIAL

## 2024-09-07 DIAGNOSIS — N39.0 URINARY TRACT INFECTION WITHOUT HEMATURIA, SITE UNSPECIFIED: ICD-10-CM

## 2024-09-07 LAB
APPEARANCE UR: ABNORMAL
BACTERIA #/AREA URNS AUTO: ABNORMAL /HPF
BILIRUB UR STRIP.AUTO-MCNC: NEGATIVE MG/DL
COLOR UR: COLORLESS
GLUCOSE UR STRIP.AUTO-MCNC: ABNORMAL MG/DL
KETONES UR STRIP.AUTO-MCNC: NEGATIVE MG/DL
LEUKOCYTE ESTERASE UR QL STRIP.AUTO: ABNORMAL
MUCOUS THREADS #/AREA URNS AUTO: ABNORMAL /LPF
NITRITE UR QL STRIP.AUTO: ABNORMAL
PH UR STRIP.AUTO: 5 [PH]
PROT UR STRIP.AUTO-MCNC: NEGATIVE MG/DL
RBC # UR STRIP.AUTO: NEGATIVE /UL
RBC #/AREA URNS AUTO: ABNORMAL /HPF
SP GR UR STRIP.AUTO: 1.01
UROBILINOGEN UR STRIP.AUTO-MCNC: NORMAL MG/DL
WBC #/AREA URNS AUTO: >50 /HPF
WBC CLUMPS #/AREA URNS AUTO: ABNORMAL /HPF

## 2024-09-07 PROCEDURE — 87186 SC STD MICRODIL/AGAR DIL: CPT

## 2024-09-07 PROCEDURE — 87086 URINE CULTURE/COLONY COUNT: CPT

## 2024-09-07 PROCEDURE — 81001 URINALYSIS AUTO W/SCOPE: CPT

## 2024-09-11 DIAGNOSIS — N39.0 URINARY TRACT INFECTION WITHOUT HEMATURIA, SITE UNSPECIFIED: Primary | ICD-10-CM

## 2024-09-11 LAB — BACTERIA UR CULT: ABNORMAL

## 2024-09-11 RX ORDER — SULFAMETHOXAZOLE AND TRIMETHOPRIM 800; 160 MG/1; MG/1
1 TABLET ORAL 2 TIMES DAILY
Qty: 20 TABLET | Refills: 0 | Status: SHIPPED | OUTPATIENT
Start: 2024-09-11 | End: 2024-09-21

## 2024-09-23 DIAGNOSIS — E11.9 TYPE 2 DIABETES MELLITUS WITHOUT COMPLICATION, WITH LONG-TERM CURRENT USE OF INSULIN (MULTI): ICD-10-CM

## 2024-09-23 DIAGNOSIS — Z79.4 TYPE 2 DIABETES MELLITUS WITHOUT COMPLICATION, WITH LONG-TERM CURRENT USE OF INSULIN (MULTI): ICD-10-CM

## 2024-09-23 RX ORDER — TIRZEPATIDE 5 MG/.5ML
INJECTION, SOLUTION SUBCUTANEOUS
Qty: 2 ML | Refills: 0 | Status: SHIPPED | OUTPATIENT
Start: 2024-09-23

## 2024-09-24 DIAGNOSIS — H20.9 IRITIS: ICD-10-CM

## 2024-09-25 RX ORDER — TIMOLOL MALEATE 5 MG/ML
1 SOLUTION/ DROPS OPHTHALMIC 2 TIMES DAILY
Qty: 5 ML | Refills: 2 | Status: SHIPPED | OUTPATIENT
Start: 2024-09-25 | End: 2025-09-25

## 2024-10-01 DIAGNOSIS — N39.0 URINARY TRACT INFECTION WITHOUT HEMATURIA, SITE UNSPECIFIED: Primary | ICD-10-CM

## 2024-10-03 ENCOUNTER — LAB (OUTPATIENT)
Dept: LAB | Facility: LAB | Age: 54
End: 2024-10-03
Payer: COMMERCIAL

## 2024-10-03 DIAGNOSIS — N39.0 URINARY TRACT INFECTION WITHOUT HEMATURIA, SITE UNSPECIFIED: ICD-10-CM

## 2024-10-03 PROCEDURE — 87186 SC STD MICRODIL/AGAR DIL: CPT

## 2024-10-03 PROCEDURE — 87086 URINE CULTURE/COLONY COUNT: CPT

## 2024-10-03 PROCEDURE — 81001 URINALYSIS AUTO W/SCOPE: CPT

## 2024-10-04 LAB
APPEARANCE UR: ABNORMAL
BACTERIA #/AREA URNS AUTO: ABNORMAL /HPF
BILIRUB UR STRIP.AUTO-MCNC: NEGATIVE MG/DL
COLOR UR: ABNORMAL
GLUCOSE UR STRIP.AUTO-MCNC: NORMAL MG/DL
KETONES UR STRIP.AUTO-MCNC: NEGATIVE MG/DL
LEUKOCYTE ESTERASE UR QL STRIP.AUTO: ABNORMAL
NITRITE UR QL STRIP.AUTO: NEGATIVE
PH UR STRIP.AUTO: 5.5 [PH]
PROT UR STRIP.AUTO-MCNC: NEGATIVE MG/DL
RBC # UR STRIP.AUTO: NEGATIVE /UL
RBC #/AREA URNS AUTO: ABNORMAL /HPF
SP GR UR STRIP.AUTO: 1.01
UROBILINOGEN UR STRIP.AUTO-MCNC: NORMAL MG/DL
WBC #/AREA URNS AUTO: >50 /HPF

## 2024-10-06 LAB — BACTERIA UR CULT: ABNORMAL

## 2024-10-07 DIAGNOSIS — N39.0 URINARY TRACT INFECTION WITHOUT HEMATURIA, SITE UNSPECIFIED: Primary | ICD-10-CM

## 2024-10-07 LAB — BACTERIA UR CULT: ABNORMAL

## 2024-10-07 RX ORDER — NITROFURANTOIN 25; 75 MG/1; MG/1
100 CAPSULE ORAL 2 TIMES DAILY
Qty: 14 CAPSULE | Refills: 0 | Status: SHIPPED | OUTPATIENT
Start: 2024-10-07 | End: 2024-10-14

## 2024-10-16 ENCOUNTER — LAB (OUTPATIENT)
Dept: LAB | Facility: LAB | Age: 54
End: 2024-10-16
Payer: COMMERCIAL

## 2024-10-16 ENCOUNTER — APPOINTMENT (OUTPATIENT)
Dept: ENDOCRINOLOGY | Facility: CLINIC | Age: 54
End: 2024-10-16
Payer: COMMERCIAL

## 2024-10-16 VITALS
WEIGHT: 234 LBS | BODY MASS INDEX: 39.95 KG/M2 | HEART RATE: 71 BPM | DIASTOLIC BLOOD PRESSURE: 69 MMHG | SYSTOLIC BLOOD PRESSURE: 102 MMHG | HEIGHT: 64 IN

## 2024-10-16 DIAGNOSIS — E78.00 HYPERCHOLESTEROLEMIA: ICD-10-CM

## 2024-10-16 DIAGNOSIS — Z79.4 TYPE 2 DIABETES MELLITUS WITH HYPERGLYCEMIA, WITH LONG-TERM CURRENT USE OF INSULIN: ICD-10-CM

## 2024-10-16 DIAGNOSIS — E78.00 HYPERCHOLESTEROLEMIA: Primary | ICD-10-CM

## 2024-10-16 DIAGNOSIS — E11.65 TYPE 2 DIABETES MELLITUS WITH HYPERGLYCEMIA, WITH LONG-TERM CURRENT USE OF INSULIN: ICD-10-CM

## 2024-10-16 DIAGNOSIS — N95.1 PERIMENOPAUSE: ICD-10-CM

## 2024-10-16 LAB
CHOLEST SERPL-MCNC: 188 MG/DL (ref 0–199)
CHOLESTEROL/HDL RATIO: 4.2
HDLC SERPL-MCNC: 44.3 MG/DL
LDLC SERPL CALC-MCNC: 87 MG/DL
NON HDL CHOLESTEROL: 144 MG/DL (ref 0–149)
POC HEMOGLOBIN A1C: 8.1 % (ref 4.2–6.5)
TRIGL SERPL-MCNC: 282 MG/DL (ref 0–149)
VLDL: 56 MG/DL (ref 0–40)

## 2024-10-16 PROCEDURE — 80061 LIPID PANEL: CPT

## 2024-10-16 PROCEDURE — 36415 COLL VENOUS BLD VENIPUNCTURE: CPT

## 2024-10-16 PROCEDURE — 3061F NEG MICROALBUMINURIA REV: CPT | Performed by: STUDENT IN AN ORGANIZED HEALTH CARE EDUCATION/TRAINING PROGRAM

## 2024-10-16 PROCEDURE — 99214 OFFICE O/P EST MOD 30 MIN: CPT | Performed by: STUDENT IN AN ORGANIZED HEALTH CARE EDUCATION/TRAINING PROGRAM

## 2024-10-16 PROCEDURE — 3048F LDL-C <100 MG/DL: CPT | Performed by: STUDENT IN AN ORGANIZED HEALTH CARE EDUCATION/TRAINING PROGRAM

## 2024-10-16 PROCEDURE — 3008F BODY MASS INDEX DOCD: CPT | Performed by: STUDENT IN AN ORGANIZED HEALTH CARE EDUCATION/TRAINING PROGRAM

## 2024-10-16 PROCEDURE — 3074F SYST BP LT 130 MM HG: CPT | Performed by: STUDENT IN AN ORGANIZED HEALTH CARE EDUCATION/TRAINING PROGRAM

## 2024-10-16 PROCEDURE — 83036 HEMOGLOBIN GLYCOSYLATED A1C: CPT | Performed by: STUDENT IN AN ORGANIZED HEALTH CARE EDUCATION/TRAINING PROGRAM

## 2024-10-16 PROCEDURE — 4010F ACE/ARB THERAPY RXD/TAKEN: CPT | Performed by: STUDENT IN AN ORGANIZED HEALTH CARE EDUCATION/TRAINING PROGRAM

## 2024-10-16 PROCEDURE — 3046F HEMOGLOBIN A1C LEVEL >9.0%: CPT | Performed by: STUDENT IN AN ORGANIZED HEALTH CARE EDUCATION/TRAINING PROGRAM

## 2024-10-16 PROCEDURE — 3078F DIAST BP <80 MM HG: CPT | Performed by: STUDENT IN AN ORGANIZED HEALTH CARE EDUCATION/TRAINING PROGRAM

## 2024-10-16 RX ORDER — HYDROCHLOROTHIAZIDE 12.5 MG/1
CAPSULE ORAL
Qty: 2 EACH | Refills: 11 | Status: SHIPPED | OUTPATIENT
Start: 2024-10-16

## 2024-10-16 RX ORDER — ACETAMINOPHEN 500 MG
5000 TABLET ORAL DAILY
COMMUNITY

## 2024-10-16 NOTE — PROGRESS NOTES
Patient coming in for follow up for T2DM    Subjective   Daniela Neri is a 53 y.o. female who presents for follow up for Type 2 diabetes mellitus.   The initial diagnosis of diabetes was made  many years ago .   Lab Results   Component Value Date    HGBA1C 10.6 (H) 2024        Interval hx:  Last visit switched to mounjaro in . Currently on Mounjaro 5 mg.  Has sarcoidosis stopped prednisone stopped a year ago    Current diabetes regimen is as follows:  Glipizide 10mg once a day XL at lunch  Novolin 20u in the morning (not taking in evening)  Metformin 2,000mg with dinner   Trulicty (stopped due to pharmacy inconsistency)   Jardiance (plan to discontinue due to side effects) stopped last minute  Taking supplements - ceylon cinnamon 1200 twice daily and berberine 500 twice daily; triple omega   Has been having issues with bowels.   No abdominal pain no nausea or vomiting  Did not take her mounjaro 5 mg   The patient is currently checking the blood glucose once per day.    Patient is using: glucometer  [phone]  Hypoglycemia frequency: No  Hypoglycemia awareness: Yes     She  cooks during the weeks, but does go out to eat on the weekends. At home, she cooks grilled chicken and pork chops. Occasionally preps veggie trays for the week.   Was on vacation last week so did not eat well.  Trying to cut down on her carbs    Known complications due to diabetes included retinopathy    Cardiovascular risk factors include diabetes mellitus, dyslipidemia, and obesity (BMI >= 30 kg/m2). The patient is on an ACE inhibitor or angiotensin II receptor blocker.      Foot Exam: No neuropathy  Eye Exam: Mild nonproliferative diabetic retinopathy of both eyes without macular edema associated with type 2 diabetes mellitus Last seen in 2024  Lipid Panel: Rosuvastatin 10 mg Lipid last in  TG were 805  Urine Albumin: lisinopril 20 mg        Review of Systems  all pertinent systems  "reviewed and are otherwise negative   Objective   /69   Pulse 71   Ht 1.626 m (5' 4\")   Wt 106 kg (234 lb)   BMI 40.17 kg/m²   Physical Exam  Constitutional:       General: She is not in acute distress.     Appearance: Normal appearance. She is obese.   HENT:      Head: Normocephalic and atraumatic.   Eyes:      Extraocular Movements: Extraocular movements intact.      Pupils: Pupils are equal, round, and reactive to light.   Cardiovascular:      Rate and Rhythm: Normal rate and regular rhythm.   Pulmonary:      Effort: Pulmonary effort is normal. No respiratory distress.      Breath sounds: Normal breath sounds.   Abdominal:      General: Bowel sounds are normal.      Palpations: Abdomen is soft.      Tenderness: There is no abdominal tenderness.   Skin:     Coloration: Skin is not jaundiced or pale.      Findings: No erythema or rash.   Neurological:      General: No focal deficit present.      Mental Status: She is alert and oriented to person, place, and time.      Deep Tendon Reflexes: Reflexes normal.   Psychiatric:         Mood and Affect: Mood normal.         Behavior: Behavior normal.         Lab Review  Glucose (mg/dL)   Date Value   08/16/2024 209 (H)   01/15/2024 203 (H)   07/14/2023 124 (H)   02/24/2023 168 (H)   10/08/2022 220 (H)     Hemoglobin A1C (%)   Date Value   06/18/2024 10.6 (H)   01/15/2024 8.5 (H)   07/14/2023 7.8 (A)   10/08/2022 9.0 (A)   07/29/2022 10.4 (A)     Bicarbonate (mmol/L)   Date Value   08/16/2024 24   01/15/2024 24   07/14/2023 23   02/24/2023 25   10/08/2022 22     Urea Nitrogen (mg/dL)   Date Value   08/16/2024 16   01/15/2024 15   07/14/2023 19   02/24/2023 17   10/08/2022 19     Creatinine (mg/dL)   Date Value   08/16/2024 0.52   01/15/2024 0.55   07/14/2023 0.55   02/24/2023 0.56   10/08/2022 0.75     Lab Results   Component Value Date    CHOL 188 10/08/2022    CHOL 197 02/25/2022    CHOL 226 (H) 08/25/2021     Lab Results   Component Value Date    HDL 33.0 (A) " "10/08/2022    HDL 38.0 (A) 2022    HDL 39.0 (A) 2021     No results found for: \"LDLCALC\"  Lab Results   Component Value Date    TRIG 805 (H) 10/08/2022    TRIG 550 (H) 2022    TRIG 420 (H) 2021     No components found for: \"CHOLHDL\"   Lab Results   Component Value Date    TSH 1.90 2024     No results found for: \"ALBUR\", \"SBN36DMV\"     Health Maintenance:       Assessment/Plan   Diagnoses and all orders for this visit:  Type 2 diabetes mellitus with hyperglycemia, with long-term current use of insulin  -     Referral to Endocrinology  Perimenopause  -     Referral to Endocrinology  Daniela Neri is a 53 y.o. female who presents for follow up for Type 2 diabetes mellitus.   Most recent A1c 8.1% 10/2024  Last visit switched to mounjaro in . Currently on Mounjaro 5 mg.  Has sarcoidosis stopped prednisone stopped a year ago    Current diabetes regimen is as follows:  Glipizide 10mg once a day XL at lunch  Novolin 20u in the morning (not taking in evening)  Metformin 2,000mg with dinner   Trulicty (stopped due to pharmacy inconsistency)   Jardiance (plan to discontinue due to side effects) stopped last minute  Taking supplements - ceylon cinnamon 1200 twice daily and berberine 500 twice daily; triple omega   Has been having issues with bowels.   No abdominal pain no nausea or vomiting  Did not take her mounjaro 5 mg   [id number]    Cardiovascular risk factors include diabetes mellitus, dyslipidemia, and obesity (BMI >= 30 kg/m2). The patient is on an ACE inhibitor or angiotensin II receptor blocker.      Foot Exam: No neuropathy  Eye Exam: Mild nonproliferative diabetic retinopathy of both eyes without macular edema associated with type 2 diabetes mellitus Last seen in 2024  Lipid Panel: Rosuvastatin 10 mg Lipid last in  TG were 805  Urine Albumin: lisinopril 20 mg\    Plan:Continue moujaro 5 mg weekly. In case BG in the next month remain " elevated let me know we will increase to 7.5 mg weekly  Continue Metformin and glipizide  For now continue NPH 20 units in the morning  Continue rosuvastatin and lisinopril  Adjust diet  Check BG 2-3 times a day with freestyle    Blood work today    RTC in 4 months    Assessment & Plan  Type 2 diabetes mellitus with hyperglycemia, with long-term current use of insulin    Orders:    Referral to Endocrinology    POCT glycosylated hemoglobin (Hb A1C) manually resulted    blood-glucose sensor (FreeStyle Lia 3 Plus Sensor) device; Check BG 3-4 times a day    Perimenopause    Orders:    Referral to Endocrinology    Hypercholesterolemia    Orders:    Lipid Panel; Future    Time spent  Coordination of care   Total time spent 35 in this encounter including chart review, coordination of care, history physical exam, counseling and placing lab orders

## 2024-10-16 NOTE — PATIENT INSTRUCTIONS
Continue moujaro 5 mg weekly. In case BG in the next month remain elevated let me know we will increase to 7.5 mg weekly  Continue Metformin and glipizide  For now continue NPH 20 units in the morning  Continue rosuvastatin and lisinopril  Adjust diet  Check BG 2-3 times a day with freestyle    Blood work today    RTC in 4 months

## 2024-10-18 ENCOUNTER — APPOINTMENT (OUTPATIENT)
Dept: ENDOCRINOLOGY | Facility: HOSPITAL | Age: 54
End: 2024-10-18
Payer: COMMERCIAL

## 2024-10-21 ENCOUNTER — APPOINTMENT (OUTPATIENT)
Dept: OPHTHALMOLOGY | Facility: CLINIC | Age: 54
End: 2024-10-21
Payer: COMMERCIAL

## 2024-10-21 DIAGNOSIS — E11.3293 MILD NONPROLIFERATIVE DIABETIC RETINOPATHY OF BOTH EYES WITHOUT MACULAR EDEMA ASSOCIATED WITH TYPE 2 DIABETES MELLITUS: ICD-10-CM

## 2024-10-21 DIAGNOSIS — E11.9 TYPE 2 DIABETES MELLITUS WITHOUT COMPLICATION, WITHOUT LONG-TERM CURRENT USE OF INSULIN (MULTI): Primary | ICD-10-CM

## 2024-10-21 PROCEDURE — 99214 OFFICE O/P EST MOD 30 MIN: CPT | Performed by: OPHTHALMOLOGY

## 2024-10-21 PROCEDURE — 92134 CPTRZ OPH DX IMG PST SGM RTA: CPT | Mod: BILATERAL PROCEDURE | Performed by: OPHTHALMOLOGY

## 2024-10-21 ASSESSMENT — SLIT LAMP EXAM - LIDS
COMMENTS: GOOD POSITION
COMMENTS: GOOD POSITION

## 2024-10-21 ASSESSMENT — CUP TO DISC RATIO
OD_RATIO: .2
OS_RATIO: .2

## 2024-10-21 ASSESSMENT — TONOMETRY
OS_IOP_MMHG: 17
OD_IOP_MMHG: 18
IOP_METHOD: GOLDMANN APPLANATION

## 2024-10-21 ASSESSMENT — ENCOUNTER SYMPTOMS
RESPIRATORY NEGATIVE: 0
ENDOCRINE NEGATIVE: 1
MUSCULOSKELETAL NEGATIVE: 0
GASTROINTESTINAL NEGATIVE: 0
EYES NEGATIVE: 1
HEMATOLOGIC/LYMPHATIC NEGATIVE: 0
PSYCHIATRIC NEGATIVE: 0
NEUROLOGICAL NEGATIVE: 0
CONSTITUTIONAL NEGATIVE: 0
ALLERGIC/IMMUNOLOGIC NEGATIVE: 0
CARDIOVASCULAR NEGATIVE: 0

## 2024-10-21 ASSESSMENT — VISUAL ACUITY
OS_CC: 20/20
OD_CC: 20/20
CORRECTION_TYPE: GLASSES
METHOD: SNELLEN - LINEAR

## 2024-10-21 ASSESSMENT — EXTERNAL EXAM - LEFT EYE: OS_EXAM: NORMAL

## 2024-10-21 ASSESSMENT — REFRACTION_WEARINGRX
OD_ADD: +2.25
OS_CYLINDER: SPHERE
OD_CYLINDER: -0.75
OD_AXIS: 090
OD_SPHERE: -0.25
OS_ADD: +2.25
OS_SPHERE: -0.25

## 2024-10-21 ASSESSMENT — EXTERNAL EXAM - RIGHT EYE: OD_EXAM: NORMAL

## 2024-10-21 NOTE — PROGRESS NOTES
Assessment/Plan       Diagnoses and all orders for this visit:  Type 2 diabetes mellitus without complication, without long-term current use of insulin (Multi)  -     OCT, Retina - OU - Both Eyes  Mild nonproliferative diabetic retinopathy of both eyes without macular edema associated with type 2 diabetes mellitus  -     OCT, Retina - OU - Both Eyes        Assessment/Plan   Diagnoses and all orders for this visit:  Presbyopia  New spec rx released today per patient request. Ocular health wnl for age OU. Monitor 1 year or sooner prn. Refraction billed today.    Seronegative spondyloarthropathy  Sarcoid  Iritis  -     prednisoLONE acetate (Pred-Forte) 1 % ophthalmic suspension; Administer 1 drop into both eyes 3 times a day for 14 days.  -     timolol (Timoptic) 0.5 % ophthalmic solution; Administer 1 drop into both eyes 2 times a day.  Discussed. Iritis reactivated today. Concern that PS is getting worse. Will consult Dr. Lowery to see if a synechialysis may be indicated.   Could not break in the office w/ mydriatics today.     Type 2 diabetes mellitus without complication, without long-term current use of insulin (Multi)  The patient has diabetes without any evidence of retinopathy.  The patient was advised to maintain tight glucose control, tight blood pressure control, and favorable levels of cholesterol, low density lipoprotein, and high density lipoproteins.  Follow up in one year was recommended.        She will needs an fluorescein angiography (FA)     [facility]    DIAGNOSTIC PROCEDURE DONE    OCT DONE OD/OS            REASON FOR TEST: will help address and tailor  therapy by detecting subclinical CME SRF     Hi quality OCT  scans obtained  signal good    OCT OD - Normal Foveal Contour, No Edema, IS/OS Junction Normal  OCT OS - Normal Foveal Contour, No Edema, IS/OS Junction Normal    additional commnents:      Fu for fluorescein angiography (FA)   Reduce to every day PF  QDT12 bid    PROCEDURE: FUNDUS  FLUORESCEIN ANGIOGRAPHY      After informed consent, fluorescein angiogram was performed      Site     Interpretation    Macula no leakage both eyes (OU)   Optic nerve well perfused with no leakage both eyes (OU)   Vessels normal in caliber with no leakage both eyes (OU)   MA ou no DME Ou no vascular leakage ou    Impression    The angiogram is consistent with the clinical finding mild non-proliferative diabetic retinopathy (NPDR) both eyes  No active uveitis posterior    Stay on PF every day ou T bid     Eddie go to every other day 3m

## 2024-10-24 NOTE — ASSESSMENT & PLAN NOTE
Orders:    Referral to Endocrinology    POCT glycosylated hemoglobin (Hb A1C) manually resulted    blood-glucose sensor (FreeStyle Lia 3 Plus Sensor) device; Check BG 3-4 times a day

## 2024-10-25 DIAGNOSIS — N39.0 RECURRENT UTI: Primary | ICD-10-CM

## 2024-11-05 ENCOUNTER — APPOINTMENT (OUTPATIENT)
Dept: RHEUMATOLOGY | Facility: CLINIC | Age: 54
End: 2024-11-05
Payer: COMMERCIAL

## 2024-11-05 ENCOUNTER — OFFICE VISIT (OUTPATIENT)
Dept: PRIMARY CARE | Facility: CLINIC | Age: 54
End: 2024-11-05
Payer: COMMERCIAL

## 2024-11-05 VITALS
RESPIRATION RATE: 16 BRPM | HEIGHT: 64 IN | TEMPERATURE: 97.2 F | OXYGEN SATURATION: 96 % | BODY MASS INDEX: 39.64 KG/M2 | SYSTOLIC BLOOD PRESSURE: 107 MMHG | DIASTOLIC BLOOD PRESSURE: 70 MMHG | HEART RATE: 77 BPM | WEIGHT: 232.2 LBS

## 2024-11-05 VITALS
BODY MASS INDEX: 39.67 KG/M2 | HEIGHT: 64 IN | SYSTOLIC BLOOD PRESSURE: 110 MMHG | WEIGHT: 232.4 LBS | HEART RATE: 86 BPM | OXYGEN SATURATION: 98 % | RESPIRATION RATE: 16 BRPM | TEMPERATURE: 98 F | DIASTOLIC BLOOD PRESSURE: 60 MMHG

## 2024-11-05 DIAGNOSIS — I10 PRIMARY HYPERTENSION: ICD-10-CM

## 2024-11-05 DIAGNOSIS — D86.9 SARCOIDOSIS: Primary | ICD-10-CM

## 2024-11-05 DIAGNOSIS — M47.819 SERONEGATIVE SPONDYLOARTHROPATHY: ICD-10-CM

## 2024-11-05 DIAGNOSIS — K76.0 FATTY INFILTRATION OF LIVER: ICD-10-CM

## 2024-11-05 DIAGNOSIS — D86.9 SARCOIDOSIS: ICD-10-CM

## 2024-11-05 DIAGNOSIS — E78.00 HYPERCHOLESTEROLEMIA: ICD-10-CM

## 2024-11-05 DIAGNOSIS — Z79.4 TYPE 2 DIABETES MELLITUS WITHOUT COMPLICATION, WITH LONG-TERM CURRENT USE OF INSULIN (MULTI): ICD-10-CM

## 2024-11-05 DIAGNOSIS — Z23 NEED FOR VACCINATION WITH 20-POLYVALENT PNEUMOCOCCAL CONJUGATE VACCINE: ICD-10-CM

## 2024-11-05 DIAGNOSIS — Z00.00 HEALTH CARE MAINTENANCE: Primary | ICD-10-CM

## 2024-11-05 DIAGNOSIS — E11.9 TYPE 2 DIABETES MELLITUS WITHOUT COMPLICATION, WITH LONG-TERM CURRENT USE OF INSULIN (MULTI): ICD-10-CM

## 2024-11-05 DIAGNOSIS — N39.0 RECURRENT UTI: ICD-10-CM

## 2024-11-05 PROCEDURE — 4010F ACE/ARB THERAPY RXD/TAKEN: CPT | Performed by: INTERNAL MEDICINE

## 2024-11-05 PROCEDURE — 99396 PREV VISIT EST AGE 40-64: CPT | Performed by: INTERNAL MEDICINE

## 2024-11-05 PROCEDURE — 3008F BODY MASS INDEX DOCD: CPT | Performed by: INTERNAL MEDICINE

## 2024-11-05 PROCEDURE — 3074F SYST BP LT 130 MM HG: CPT | Performed by: INTERNAL MEDICINE

## 2024-11-05 PROCEDURE — 90677 PCV20 VACCINE IM: CPT | Performed by: INTERNAL MEDICINE

## 2024-11-05 PROCEDURE — 3046F HEMOGLOBIN A1C LEVEL >9.0%: CPT | Performed by: INTERNAL MEDICINE

## 2024-11-05 PROCEDURE — 1036F TOBACCO NON-USER: CPT | Performed by: INTERNAL MEDICINE

## 2024-11-05 PROCEDURE — 3048F LDL-C <100 MG/DL: CPT | Performed by: INTERNAL MEDICINE

## 2024-11-05 PROCEDURE — 99214 OFFICE O/P EST MOD 30 MIN: CPT | Performed by: INTERNAL MEDICINE

## 2024-11-05 PROCEDURE — 3061F NEG MICROALBUMINURIA REV: CPT | Performed by: INTERNAL MEDICINE

## 2024-11-05 PROCEDURE — 3078F DIAST BP <80 MM HG: CPT | Performed by: INTERNAL MEDICINE

## 2024-11-05 PROCEDURE — 90471 IMMUNIZATION ADMIN: CPT | Performed by: INTERNAL MEDICINE

## 2024-11-05 RX ORDER — NITROFURANTOIN MACROCRYSTALS 50 MG/1
50 CAPSULE ORAL NIGHTLY
Qty: 30 CAPSULE | Refills: 0 | Status: SHIPPED | OUTPATIENT
Start: 2024-11-05 | End: 2024-12-05

## 2024-11-05 ASSESSMENT — ENCOUNTER SYMPTOMS
EYE DISCHARGE: 0
NECK STIFFNESS: 0
CONSTIPATION: 0
ADENOPATHY: 0
EYE REDNESS: 0
CARDIOVASCULAR NEGATIVE: 1
HEADACHES: 0
ENDOCRINE NEGATIVE: 1
FLANK PAIN: 0
SPEECH DIFFICULTY: 0
DYSURIA: 1
WOUND: 0
ARTHRALGIAS: 0
UNEXPECTED WEIGHT CHANGE: 0
NUMBNESS: 0
AGITATION: 0
BRUISES/BLEEDS EASILY: 0
PALPITATIONS: 0
EYES NEGATIVE: 1
LIGHT-HEADEDNESS: 0
SEIZURES: 0
CONSTITUTIONAL NEGATIVE: 1
APPETITE CHANGE: 0
WEAKNESS: 0
MYALGIAS: 0
VOICE CHANGE: 0
POLYDIPSIA: 0
SLEEP DISTURBANCE: 0
SORE THROAT: 0
PSYCHIATRIC NEGATIVE: 1
ABDOMINAL PAIN: 0
COUGH: 0
NECK PAIN: 0
DIARRHEA: 0
TREMORS: 0
NERVOUS/ANXIOUS: 0
ACTIVITY CHANGE: 0
NAUSEA: 0
CONFUSION: 0
DIZZINESS: 0
FACIAL ASYMMETRY: 0
BACK PAIN: 0
TROUBLE SWALLOWING: 0
STRIDOR: 0
EYE PAIN: 0
SHORTNESS OF BREATH: 0
SINUS PRESSURE: 0
GASTROINTESTINAL NEGATIVE: 1
RESPIRATORY NEGATIVE: 1
COLOR CHANGE: 0
WHEEZING: 0
POLYPHAGIA: 0
DIFFICULTY URINATING: 0
ALLERGIC/IMMUNOLOGIC NEGATIVE: 1
CHEST TIGHTNESS: 0
JOINT SWELLING: 0
NEUROLOGICAL NEGATIVE: 1
HEMATOLOGIC/LYMPHATIC NEGATIVE: 1
FREQUENCY: 1
VOMITING: 0
HALLUCINATIONS: 0
SINUS PAIN: 0
MUSCULOSKELETAL NEGATIVE: 1
BLOOD IN STOOL: 0

## 2024-11-05 NOTE — PATIENT INSTRUCTIONS
Check CMP.  Follow-up in 3 months.  If she continues to get episodes of uveitis, can consider other steroid sparing therapy (ie infliximab or adalimumab).

## 2024-11-05 NOTE — PROGRESS NOTES
Subjective   Patient ID: Daniela Neri is a 53 y.o. female who presents for Annual Exam (Pt is here due to annual physical ).  HPI  Patient comes for CPE and f/up visit.     Patient has been feeling pretty good and has been complaint with prescribed medications.  Concerned about recurrent UTI.  Her appt with urology is scheduled in 1 month.  Not able to see urology provider sooner.  Will start prophylactic low dose Macrobid 50 mg daily, also D-Mannose 5000 mg daily.     We reviewed and discussed details of recent blood work: CBC, CMP, TSH, Lipid panel, Hb A1c, Vit D done in 2024.   Results within acceptable range except elevated glucose, TG  and HbA1c, mildly elevated LFT's.  Recent HbA1c in oct 2024 was 8.1.    Last mammogram: 6/2023  PAP: per GYN, last 2023  Colonoscopy: 2020, next 2030  Pneumonia vacc: 2019  Eye exam (DM): 6/2024, no BDR      This is 52 yo female with very complex medical history including DM (uncontrolled), HTN, HLD, obesity, fatty liver, asthma, seronegative spondyloarthropathy, sarcoidosis, anxiety.       Patient started Mounjaro in June 2024 and has been tolerating it well.  No side effects noted.  She is also taking insulin, Glipizide and Metformin.   Recently established with new endocrinologist Dr. Hutton.     She has been following with Dr. Olmos regarding sarcoidosis and dose of prednisone was gradually reduced, then weaned off.      She was [facility] ER in October 2021with abdominal pain. Diagnosed with colitis, symptoms improved after tx with antibiotic. CT showed thickened wall of abdomen.     Her last colonoscopy was in 2020 and normal, next screening colonoscopy[y advised in 2030.    Review of Systems   Constitutional: Negative.  Negative for activity change, appetite change and unexpected weight change.   HENT: Negative.  Negative for congestion, ear discharge, ear pain, hearing loss, mouth sores, nosebleeds, sinus pressure, sinus pain, sore throat, trouble swallowing and  "voice change.    Eyes: Negative.  Negative for pain, discharge, redness and visual disturbance.   Respiratory: Negative.  Negative for cough, chest tightness, shortness of breath, wheezing and stridor.    Cardiovascular: Negative.  Negative for chest pain, palpitations and leg swelling.   Gastrointestinal: Negative.  Negative for abdominal pain, blood in stool, constipation, diarrhea, nausea and vomiting.   Endocrine: Negative.  Negative for polydipsia, polyphagia and polyuria.   Genitourinary:  Positive for dysuria and frequency. Negative for difficulty urinating, flank pain and urgency.   Musculoskeletal: Negative.  Negative for arthralgias, back pain, gait problem, joint swelling, myalgias, neck pain and neck stiffness.   Skin: Negative.  Negative for color change, rash and wound.   Allergic/Immunologic: Negative.  Negative for environmental allergies, food allergies and immunocompromised state.   Neurological: Negative.  Negative for dizziness, tremors, seizures, syncope, facial asymmetry, speech difficulty, weakness, light-headedness, numbness and headaches.   Hematological: Negative.  Negative for adenopathy. Does not bruise/bleed easily.   Psychiatric/Behavioral: Negative.  Negative for agitation, behavioral problems, confusion, hallucinations, sleep disturbance and suicidal ideas. The patient is not nervous/anxious.    All other systems reviewed and are negative.      Objective     Review of systems was performed and all systems were negative except what in HPI    /60 (BP Location: Left arm, Patient Position: Sitting, BP Cuff Size: Large adult)   Pulse 86   Temp 36.7 °C (98 °F) (Temporal)   Resp 16   Ht 1.626 m (5' 4\")   Wt 105 kg (232 lb 6.4 oz)   SpO2 98%   BMI 39.89 kg/m²    Physical Exam  Vitals and nursing note reviewed.   Constitutional:       General: She is not in acute distress.     Appearance: Normal appearance.   HENT:      Head: Normocephalic and atraumatic.      Right Ear: Tympanic " membrane, ear canal and external ear normal.      Left Ear: Tympanic membrane, ear canal and external ear normal.      Nose: Nose normal. No congestion or rhinorrhea.      Mouth/Throat:      Mouth: Mucous membranes are moist.      Pharynx: Oropharynx is clear.   Eyes:      General:         Right eye: No discharge.         Left eye: No discharge.      Extraocular Movements: Extraocular movements intact.      Conjunctiva/sclera: Conjunctivae normal.      Pupils: Pupils are equal, round, and reactive to light.   Cardiovascular:      Rate and Rhythm: Normal rate and regular rhythm.      Pulses: Normal pulses.      Heart sounds: Normal heart sounds. No murmur heard.     No friction rub. No gallop.   Pulmonary:      Effort: Pulmonary effort is normal. No respiratory distress.      Breath sounds: Normal breath sounds. No stridor. No wheezing, rhonchi or rales.   Chest:      Chest wall: No tenderness.   Abdominal:      General: Bowel sounds are normal.      Palpations: Abdomen is soft. There is no mass.      Tenderness: There is no abdominal tenderness. There is no guarding or rebound.   Musculoskeletal:         General: No swelling or deformity. Normal range of motion.      Cervical back: Normal range of motion and neck supple. No rigidity or tenderness.      Right lower leg: No edema.      Left lower leg: No edema.   Lymphadenopathy:      Cervical: No cervical adenopathy.   Skin:     General: Skin is warm and dry.      Coloration: Skin is not jaundiced.      Findings: No bruising or erythema.   Neurological:      General: No focal deficit present.      Mental Status: She is alert and oriented to person, place, and time. Mental status is at baseline.      Cranial Nerves: No cranial nerve deficit.      Motor: No weakness.      Coordination: Coordination normal.      Gait: Gait normal.   Psychiatric:         Mood and Affect: Mood normal.         Behavior: Behavior normal.         Thought Content: Thought content normal.          Judgment: Judgment normal.         Assessment/Plan   Problem List Items Addressed This Visit             ICD-10-CM       Cardiac and Vasculature    Hypercholesterolemia E78.00     Continue statin.           Hypertension I10     Continue current treatment.             Endocrine/Metabolic    Type 2 diabetes mellitus without complications (Multi) E11.9     Continue current treatment. F/up with endocrinology.             Gastrointestinal and Abdominal    Fatty infiltration of liver K76.0     Weight loss is advised. Target BMI: 25. Please follow low carbohydrate diet and exercise at least 150 minutes weekly.  Nutritional consultation is available, please let me know if interested.             Genitourinary and Reproductive    Recurrent UTI N39.0     Start Macrobid, D-Mannose as discussed.  Consult urology.         Relevant Medications    nitrofurantoin (Macrodantin) 50 mg capsule       Health Encounters    Health care maintenance - Primary Z00.00       Multi-system (Lupus, Sarcoid)    Sarcoidosis D86.9     Clinically stable. F/up with rheumatology.             Neuro    Seronegative spondyloarthropathy M47.819     Clinically stable. F/up with rheumatology.          Other Visit Diagnoses         Codes    Need for vaccination with 20-polyvalent pneumococcal conjugate vaccine     Z23    Relevant Orders    Pneumococcal conjugate vaccine, 20-valent (PREVNAR 20) (Completed)        It was a pleasure to see you today.  I would like to remind you about importance of a healthy lifestyle in order to improve your well-being and live longer.  Try to engage in physical activities for at least 150 minutes per week.  Eat about 10 servings of fruits and vegetables daily. My advice is 2 servings of fruits and 8 servings of vegetables.  For vegetables choose at least half of them green and at least half of them fresh.  Please avoid sugar, salt, fried food and saturated fat.    I spent a total of 30 minutes on the date of service for  follow up visit, which included preparing to see the patient, face-to-face patient care, completing clinical documentation, obtaining and/or reviewing separately obtained history, performing a medically appropriate examination, counseling and educating the patient/family/caregiver, ordering medications, tests, or procedures, communicating with other health care providers (not separately reported), independently interpreting results (not separately reported), communicating results to the patient/family/caregiver, and care coordination (not separately reported).    F/up in 6 months or sooner if needed.

## 2024-11-05 NOTE — PROGRESS NOTES
Subjective   Patient ID: Daniela Neri is a 53 y.o. female who presents for follow-up regarding sarcoidosis.    HPI 53-year-old female here for f/u re: sarcoidosis. This has involved her liver, skin of her scalp, right breast, and retroperitoneal lymph nodes. She also had hypercalcemia and abdominal pain November 2020. (calcium 14.4)  She was treated with gradual prednisone taper.  Prednisone was reduced to 2.5 mg daily December 2022.,  Prednisone was stopped 3/23.    She currently denies joint pain.  She denies any recurrence of rashes.    She is still using prednisone eyedrops for her iritis.     She was diagnosed with recurrent left eye anterior uveitis.  She initially saw Dr. Paz, now is seeing Dr. Lowery.  She is being treated with prednisolone 1% 1 gtt both eyes 3x daily for 14 days.  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  She was advised she will be on the prednisone eyedrops for another 3 months.    Prior to this, last episode of iritis was 2005.     She complains of recurrent UTIs.  She has an upcoming appointment with nurse practitioner Akua Bell.     Following is a summary of biopsies:   -Right breast biopsy 3/20: Nonnecrotizing granulomatous inflammation with giant cell reaction.   -Scalp biopsy November 2020: Granulomatous inflammation.   -Liver biopsy October 2020: Nonnecrotizing granulomas with associated fibrosis, mild portal inflammation with preserved bile ducts   -Retroperitoneal lymph node biopsy September 2020: Nonnecrotizing granulomas. Stains for AFB and fungus were negative.     The patient first recalls being hospitalized in the sixth grade when she had mononucleosis. She actually had a bone marrow biopsy at that time, and required IV feedings for a period of time.     She developed symptoms of joint pain when she was in the ninth grade. Sometimes she had neck pain or swollen toe or finger.     She had several episodes of iritis when she was in her 30s, but has not  had iritis since her 30s. (Last episode 2005).    She developed a right facial weakness 2001, that was diagnosed by a neurologist as post viral polyradiculopathy. She had extensive evaluation at that time, including MRI of the brain. She still has right facial weakness.     She was seen by me for several years with episodic back pain and asymmetric peripheral joint pain. 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 . She has not had much joint pain for the last 15 to 20 years.     She was seen by me November 2019 because of polyclonal gammopathy. She had a mildly elevated alkaline phosphatase of 184, with mildly elevated liver transaminases. Testing for hepatitis A, B and C was negative. Labs were done at that time which showed a normal serum ACE level, negative HLA-B27, negative citrulline antibody, negative rheumatoid factor, negative antimitochondrial antibody, negative smooth muscle antibody, negative Lyme JUAN CARLOS.     She has also had issues with her scalp over the last 2 years, for which she has been seeing Dr. Garcia in dermatology. At 1 point this was thought to possibly be psoriasis. She was treated with ketoconazole shampoo and fluocinonide lotion. Dr. Garcia did a biopsy of her scalp November 2020, there was interpreted as a granulomatous dermatitis.     The patient also had a breast biopsy done March 2020 because of a mass found in her breast. Pathology report was consistent with nonnecrotizing granulomatous inflammation with giant cell reaction.     The patient also developed some abdominal pain August 2020, along with increasing liver enzymes. CT of the abdomen and done August 2020 showed marked splenomegaly, enlarged liver with lobulated lobulated contour, moderate mesenteric and retroperitoneal lymphadenopathy, and wall thickening of the proximal duodenum.     Duodenal biopsy done December 2020 was unremarkable. Gastric biopsy was unremarkable.  "Colon biopsy was unremarkable. Liver biopsy done October 2020 showed well-formed nonnecrotizing granulomas with associated fibrosis, mild portal inflammation with preserved bile ducts, and no fibrosis otherwise demonstrated.     Retroperitoneal lymph node biopsy done September 2020 showed nonnecrotizing granulomas involving fibrous and lymphoid tissue. Stains for AFB and fungus were negative.     The patient was hospitalized November 19, 2020 through November 22, 2020 with nausea and vomiting. She was also found to have hypercalcemia, calcium 14.4. Her alkaline phosphatase had increased as high as 475 June 2020. She was started on prednisone 40 mg daily early 12/20 for presumptive diagnosis of sarcoid.  Prednisone was tapered to 20 mg daily 3/02/21.    Other specialists involved in her care include Dr. Almaraz (hepatology) and Dr. Gutierrez (hematology), as well as Dr. Garcia in dermatology.  They agree the diagnosis of sarcoidosis.     DEXA July 2021 was normal.     Labs 2/22: CMP normal except glucose 161 and ALT 47, 25-hydroxy vitamin D 48, CBC normal, hemoglobin A1c 9.4  Labs October 2022: CBC normal, CMP normal except sodium 134 and glucose 220, hemoglobin A1c 9.0, cholesterol 188, HDL 33, triglycerides 805  Labs 2/23: CMP normal except glucose 168, ESR 29  Labs July 2023: CMP normal except glucose 124 and hemoglobin A1c 7.8  Labs 1/24: CMP normal excepty glucose 203, AST 54 (9-39), ALT 68 (7-45)  Labs 6/24: Hemoglobin A1c 10.6, TSH 1.9, 25-hydroxy vitamin D 42  Labs July 2024: ANCA negative, urine albumin to creatinine ratio 19.7  Labs August 2024: CMP normal except ALT 76 (normal 7-45) and glucose 219, CBC normal, ESR 18     Medical problem list:   - h/o seronegative spondyloarthropathy (started 9th grade- had neck stiffness, \"sausage digits\" intermittently.   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  Surgeries: none.     Social history: , no children.   Quit smoking 2009.   Occupation: .     Family history: Mother- ITP   No family h/o ankylosing spondylitis, psoriasis, ulcerative colitis, Crohn's disease.    Health maintenance:  Flu shot November 9, 2023  Pfizer COVID-vaccine November 9, 2023          ROS:  General: Denies fevers or chills.  CV: Denies chest pain or palpitations.  Denies leg edema.  Lungs: Denies coughing or shortness of breath.  Skin: Denies rashes or nodules.  MS: Denies joint pain or joint swelling.     Objective   /70 (BP Location: Left arm, Patient Position: Sitting, BP Cuff Size: Large adult)   Pulse 77   Temp 36.2 °C (97.2 °F) (Skin)   Resp 16   Ht 1.626 m (5' 4\")   Wt 105 kg (232 lb 3.2 oz)   SpO2 96%   BMI 39.86 kg/m²     Physical Exam  General appearance: Well-nourished well-appearing.  HEENT: PERRL, EOMI  Neck: Supple, no nodes.  CV: RRR, no MGR.  Lungs: Clear, no rales or wheezes.  Abdomen: Soft, nontender. No hepatosplenomegaly.  Extremities:  No cyanosis, clubbing, or edema.  MS: No synovitis.   Skin: No rashes or nodules.      Assessment/Plan   Problem List Items Addressed This Visit             ICD-10-CM    Sarcoidosis - Primary D86.9    Relevant Orders    Comprehensive Metabolic Panel         1. Sarcoidosis- manifested by elevated liver enzymes (especially alkaline phosphatase), hypercalcemia, rash in scalp, hepatosplenomegaly with retroperitoneal " lymphadenopathy, history of iritis in her 30s.  Now has a recurrence of anterior uveitis, left eye more than the right eye.  She is currently on prednisone 1% eyedrops 1 drop 3 times daily to both eyes.-She will be on prednisone eyedrops for another 3 months she is under the care of Dr. Lowery.     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 She also had a facial weakness 2001 (mostly on the right side)-this was diagnosed by her neurologist as being post viral polyradiculopathy after an extensive evaluation, including MRI of the brain. I suspect that this may have been a Bell's palsy related to sarcoid as well.     Confirmatory biopsies include breast biopsy March 2020, scalp biopsy November 2020, liver biopsy October 2020, retroperitoneal lymph node biopsy September 2020. T spot 9/20 was negative. Stains for AFB and fungus have been negative.     Her predominant manifestations that required treatment in thre past were hypercalcemia and abdominal pain.  Her calcium is currently normal and she is not having abdominal pain.     Prednisone was stopped March 2023 with no recurrence of her previous symptoms.    If she continues to have episodes of iritis, I would consider adding Humira or Remicade.    2. Type 2 diabetes-she will continue follow-up with Kiera Tariq for management. Hemoglobin A1c is currently 8.1 October 2024.  Starting mounjaro.     3. BMI 39-stable, will continue to work on weight loss. Also starting Mounjaro.    4.  Adhesive capsulitis right shoulder-doing better with swimming.    5. Recurrent UTI- will be seeing urology  12/16/24.    Plan:  Check CMP.  Follow-up in 3 months.  If she continues to get episodes of uveitis, can consider other steroid sparing therapy (ie infliximab  or adalimumab).

## 2024-11-06 ENCOUNTER — LAB (OUTPATIENT)
Dept: LAB | Facility: LAB | Age: 54
End: 2024-11-06
Payer: COMMERCIAL

## 2024-11-06 ENCOUNTER — HOSPITAL ENCOUNTER (OUTPATIENT)
Dept: RADIOLOGY | Facility: CLINIC | Age: 54
Discharge: HOME | End: 2024-11-06
Payer: COMMERCIAL

## 2024-11-06 DIAGNOSIS — Z12.31 SCREENING MAMMOGRAM FOR BREAST CANCER: ICD-10-CM

## 2024-11-06 DIAGNOSIS — D86.9 SARCOIDOSIS: ICD-10-CM

## 2024-11-06 LAB
ALBUMIN SERPL BCP-MCNC: 4.4 G/DL (ref 3.4–5)
ALP SERPL-CCNC: 72 U/L (ref 33–110)
ALT SERPL W P-5'-P-CCNC: 44 U/L (ref 7–45)
ANION GAP SERPL CALC-SCNC: 16 MMOL/L (ref 10–20)
AST SERPL W P-5'-P-CCNC: 38 U/L (ref 9–39)
BILIRUB SERPL-MCNC: 0.4 MG/DL (ref 0–1.2)
BUN SERPL-MCNC: 15 MG/DL (ref 6–23)
CALCIUM SERPL-MCNC: 9.6 MG/DL (ref 8.6–10.6)
CHLORIDE SERPL-SCNC: 103 MMOL/L (ref 98–107)
CO2 SERPL-SCNC: 25 MMOL/L (ref 21–32)
CREAT SERPL-MCNC: 0.5 MG/DL (ref 0.5–1.05)
EGFRCR SERPLBLD CKD-EPI 2021: >90 ML/MIN/1.73M*2
GLUCOSE SERPL-MCNC: 167 MG/DL (ref 74–99)
POTASSIUM SERPL-SCNC: 4.5 MMOL/L (ref 3.5–5.3)
PROT SERPL-MCNC: 7.9 G/DL (ref 6.4–8.2)
SODIUM SERPL-SCNC: 139 MMOL/L (ref 136–145)

## 2024-11-06 PROCEDURE — 77063 BREAST TOMOSYNTHESIS BI: CPT | Performed by: RADIOLOGY

## 2024-11-06 PROCEDURE — 77067 SCR MAMMO BI INCL CAD: CPT

## 2024-11-06 PROCEDURE — 80053 COMPREHEN METABOLIC PANEL: CPT

## 2024-11-06 PROCEDURE — 77067 SCR MAMMO BI INCL CAD: CPT | Performed by: RADIOLOGY

## 2024-11-06 PROCEDURE — 36415 COLL VENOUS BLD VENIPUNCTURE: CPT

## 2024-11-06 NOTE — PROGRESS NOTES
Subjective   Patient ID: Daniela Neri is a 53 y.o. female who presents for Annual Exam (Pt is here due to annual physical ).  HPI  Patient comes for CPE and f/up visit.     Patient has been feeling pretty good and has been complaint with prescribed medications.  Concerned about recurrent UTI.  Her appt with urology is scheduled in 1 month.  Not able to see urology provider sooner.  Will start prophylactic low dose Macrobid 50 mg daily, also D-Mannose 5000 mg daily.     We reviewed and discussed details of recent blood work: CBC, CMP, TSH, Lipid panel, Hb A1c, Vit D done in 2024.   Results within acceptable range except elevated glucose, TG  and HbA1c, mildly elevated LFT's.  Recent HbA1c in oct 2024 was 8.1.    Last mammogram: 6/2023  PAP: per GYN, last 2023  Colonoscopy: 2020, next 2030  Pneumonia vacc: 2019  Eye exam (DM): 6/2024, no BDR      This is 54 yo female with very complex medical history including DM (uncontrolled), HTN, HLD, obesity, fatty liver, asthma, seronegative spondyloarthropathy, sarcoidosis, anxiety.       Patient started Mounjaro in June 2024 and has been tolerating it well.  No side effects noted.  She is also taking insulin, Glipizide and Metformin.   Recently established with new endocrinologist Dr. Hutton.     She has been following with Dr. Olmos regarding sarcoidosis and dose of prednisone was gradually reduced, then weaned off.      She was seen at [facility] ER in October 2021with abdominal pain. Diagnosed with colitis, symptoms improved after tx with antibiotic. CT showed thickened wall of abdomen.     Her last colonoscopy was in 2020 and normal, next screening colonoscopy[y advised in 2030.    Review of Systems   Constitutional: Negative.  Negative for activity change, appetite change and unexpected weight change.   HENT: Negative.  Negative for congestion, ear discharge, ear pain, hearing loss, mouth sores, nosebleeds, sinus pressure, sinus pain, sore throat, trouble swallowing and  "voice change.    Eyes: Negative.  Negative for pain, discharge, redness and visual disturbance.   Respiratory: Negative.  Negative for cough, chest tightness, shortness of breath, wheezing and stridor.    Cardiovascular: Negative.  Negative for chest pain, palpitations and leg swelling.   Gastrointestinal: Negative.  Negative for abdominal pain, blood in stool, constipation, diarrhea, nausea and vomiting.   Endocrine: Negative.  Negative for polydipsia, polyphagia and polyuria.   Genitourinary:  Positive for dysuria and frequency. Negative for difficulty urinating, flank pain and urgency.   Musculoskeletal: Negative.  Negative for arthralgias, back pain, gait problem, joint swelling, myalgias, neck pain and neck stiffness.   Skin: Negative.  Negative for color change, rash and wound.   Allergic/Immunologic: Negative.  Negative for environmental allergies, food allergies and immunocompromised state.   Neurological: Negative.  Negative for dizziness, tremors, seizures, syncope, facial asymmetry, speech difficulty, weakness, light-headedness, numbness and headaches.   Hematological: Negative.  Negative for adenopathy. Does not bruise/bleed easily.   Psychiatric/Behavioral: Negative.  Negative for agitation, behavioral problems, confusion, hallucinations, sleep disturbance and suicidal ideas. The patient is not nervous/anxious.    All other systems reviewed and are negative.      Objective     Review of systems was performed and all systems were negative except what in HPI    /60 (BP Location: Left arm, Patient Position: Sitting, BP Cuff Size: Large adult)   Pulse 86   Temp 36.7 °C (98 °F) (Temporal)   Resp 16   Ht 1.626 m (5' 4\")   Wt 105 kg (232 lb 6.4 oz)   SpO2 98%   BMI 39.89 kg/m²    Physical Exam  Vitals and nursing note reviewed.   Constitutional:       General: She is not in acute distress.     Appearance: Normal appearance.   HENT:      Head: Normocephalic and atraumatic.      Right Ear: Tympanic " membrane, ear canal and external ear normal.      Left Ear: Tympanic membrane, ear canal and external ear normal.      Nose: Nose normal. No congestion or rhinorrhea.      Mouth/Throat:      Mouth: Mucous membranes are moist.      Pharynx: Oropharynx is clear.   Eyes:      General:         Right eye: No discharge.         Left eye: No discharge.      Extraocular Movements: Extraocular movements intact.      Conjunctiva/sclera: Conjunctivae normal.      Pupils: Pupils are equal, round, and reactive to light.   Cardiovascular:      Rate and Rhythm: Normal rate and regular rhythm.      Pulses: Normal pulses.      Heart sounds: Normal heart sounds. No murmur heard.     No friction rub. No gallop.   Pulmonary:      Effort: Pulmonary effort is normal. No respiratory distress.      Breath sounds: Normal breath sounds. No stridor. No wheezing, rhonchi or rales.   Chest:      Chest wall: No tenderness.   Abdominal:      General: Bowel sounds are normal.      Palpations: Abdomen is soft. There is no mass.      Tenderness: There is no abdominal tenderness. There is no guarding or rebound.   Musculoskeletal:         General: No swelling or deformity. Normal range of motion.      Cervical back: Normal range of motion and neck supple. No rigidity or tenderness.      Right lower leg: No edema.      Left lower leg: No edema.   Lymphadenopathy:      Cervical: No cervical adenopathy.   Skin:     General: Skin is warm and dry.      Coloration: Skin is not jaundiced.      Findings: No bruising or erythema.   Neurological:      General: No focal deficit present.      Mental Status: She is alert and oriented to person, place, and time. Mental status is at baseline.      Cranial Nerves: No cranial nerve deficit.      Motor: No weakness.      Coordination: Coordination normal.      Gait: Gait normal.   Psychiatric:         Mood and Affect: Mood normal.         Behavior: Behavior normal.         Thought Content: Thought content normal.          Judgment: Judgment normal.         Assessment/Plan   Problem List Items Addressed This Visit             ICD-10-CM       Cardiac and Vasculature    Hypercholesterolemia E78.00     Continue statin.           Hypertension I10     Continue current treatment.             Endocrine/Metabolic    Type 2 diabetes mellitus without complications (Multi) E11.9     Continue current treatment. F/up with endocrinology.             Gastrointestinal and Abdominal    Fatty infiltration of liver K76.0     Weight loss is advised. Target BMI: 25. Please follow low carbohydrate diet and exercise at least 150 minutes weekly.  Nutritional consultation is available, please let me know if interested.             Genitourinary and Reproductive    Recurrent UTI N39.0     Start Macrobid, D-Mannose as discussed.  Consult urology.         Relevant Medications    nitrofurantoin (Macrodantin) 50 mg capsule       Health Encounters    Health care maintenance - Primary Z00.00       Multi-system (Lupus, Sarcoid)    Sarcoidosis D86.9     Clinically stable. F/up with rheumatology.             Neuro    Seronegative spondyloarthropathy M47.819     Clinically stable. F/up with rheumatology.          Other Visit Diagnoses         Codes    Need for vaccination with 20-polyvalent pneumococcal conjugate vaccine     Z23    Relevant Orders    Pneumococcal conjugate vaccine, 20-valent (PREVNAR 20) (Completed)        It was a pleasure to see you today.  I would like to remind you about importance of a healthy lifestyle in order to improve your well-being and live longer.  Try to engage in physical activities for at least 150 minutes per week.  Eat about 10 servings of fruits and vegetables daily. My advice is 2 servings of fruits and 8 servings of vegetables.  For vegetables choose at least half of them green and at least half of them fresh.  Please avoid sugar, salt, fried food and saturated fat.    I spent a total of 30 minutes on the date of service for  follow up visit, which included preparing to see the patient, face-to-face patient care, completing clinical documentation, obtaining and/or reviewing separately obtained history, performing a medically appropriate examination, counseling and educating the patient/family/caregiver, ordering medications, tests, or procedures, communicating with other health care providers (not separately reported), independently interpreting results (not separately reported), communicating results to the patient/family/caregiver, and care coordination (not separately reported).    F/up in 6 months or sooner if needed.

## 2024-11-22 DIAGNOSIS — E11.65 TYPE 2 DIABETES MELLITUS WITH HYPERGLYCEMIA, WITH LONG-TERM CURRENT USE OF INSULIN: Primary | ICD-10-CM

## 2024-11-22 DIAGNOSIS — H20.9 UNSPECIFIED IRIDOCYCLITIS: ICD-10-CM

## 2024-11-22 DIAGNOSIS — Z79.4 TYPE 2 DIABETES MELLITUS WITH HYPERGLYCEMIA, WITH LONG-TERM CURRENT USE OF INSULIN: Primary | ICD-10-CM

## 2024-11-22 RX ORDER — PREDNISOLONE ACETATE 10 MG/ML
SUSPENSION/ DROPS OPHTHALMIC
Qty: 15 ML | Refills: 2 | Status: SHIPPED | OUTPATIENT
Start: 2024-11-22

## 2024-11-22 RX ORDER — TIRZEPATIDE 7.5 MG/.5ML
7.5 INJECTION, SOLUTION SUBCUTANEOUS
Qty: 2 ML | Refills: 3 | Status: SHIPPED | OUTPATIENT
Start: 2024-11-22

## 2024-12-10 DIAGNOSIS — E87.6 HYPOKALEMIA: ICD-10-CM

## 2024-12-10 RX ORDER — POTASSIUM CHLORIDE 1500 MG/1
20 TABLET, EXTENDED RELEASE ORAL DAILY
Qty: 90 TABLET | Refills: 3 | Status: SHIPPED | OUTPATIENT
Start: 2024-12-10

## 2024-12-16 ENCOUNTER — APPOINTMENT (OUTPATIENT)
Dept: UROLOGY | Facility: CLINIC | Age: 54
End: 2024-12-16
Payer: COMMERCIAL

## 2024-12-16 VITALS — DIASTOLIC BLOOD PRESSURE: 78 MMHG | SYSTOLIC BLOOD PRESSURE: 125 MMHG | HEART RATE: 88 BPM

## 2024-12-16 DIAGNOSIS — N39.0 RECURRENT UTI: Primary | ICD-10-CM

## 2024-12-16 LAB
POC APPEARANCE, URINE: CLEAR
POC BILIRUBIN, URINE: NEGATIVE
POC BLOOD, URINE: ABNORMAL
POC COLOR, URINE: YELLOW
POC GLUCOSE, URINE: NEGATIVE MG/DL
POC KETONES, URINE: NEGATIVE MG/DL
POC LEUKOCYTES, URINE: ABNORMAL
POC NITRITE,URINE: NEGATIVE
POC PH, URINE: 5.5 PH
POC PROTEIN, URINE: NEGATIVE MG/DL
POC SPECIFIC GRAVITY, URINE: 1.01
POC UROBILINOGEN, URINE: 0.2 EU/DL

## 2024-12-16 PROCEDURE — 99203 OFFICE O/P NEW LOW 30 MIN: CPT | Performed by: NURSE PRACTITIONER

## 2024-12-16 PROCEDURE — G2211 COMPLEX E/M VISIT ADD ON: HCPCS | Performed by: NURSE PRACTITIONER

## 2024-12-16 PROCEDURE — 3074F SYST BP LT 130 MM HG: CPT | Performed by: NURSE PRACTITIONER

## 2024-12-16 PROCEDURE — 3061F NEG MICROALBUMINURIA REV: CPT | Performed by: NURSE PRACTITIONER

## 2024-12-16 PROCEDURE — 4010F ACE/ARB THERAPY RXD/TAKEN: CPT | Performed by: NURSE PRACTITIONER

## 2024-12-16 PROCEDURE — 3048F LDL-C <100 MG/DL: CPT | Performed by: NURSE PRACTITIONER

## 2024-12-16 PROCEDURE — 3078F DIAST BP <80 MM HG: CPT | Performed by: NURSE PRACTITIONER

## 2024-12-16 PROCEDURE — 81003 URINALYSIS AUTO W/O SCOPE: CPT | Performed by: NURSE PRACTITIONER

## 2024-12-16 PROCEDURE — 3046F HEMOGLOBIN A1C LEVEL >9.0%: CPT | Performed by: NURSE PRACTITIONER

## 2024-12-16 RX ORDER — ESTRADIOL 0.1 MG/G
CREAM VAGINAL
Qty: 42.5 G | Refills: 5 | Status: SHIPPED | OUTPATIENT
Start: 2024-12-16 | End: 2025-12-16

## 2024-12-16 NOTE — PROGRESS NOTES
Subjective   Patient ID: Daniela Neri is a 54 y.o. female who presents for Recurrent UTI .  Patient presents to establish care related to recurrent UTIs. She developed E. Coli UTI in August, September, and October. Patient had persistent symptoms at time of follow up with PCP in early Nov, treated with 1 month of macrobid with resolution of symptoms completed in early Dec.     Symptoms included urgency, frequency, odor, and cloudiness.     Previously on jardiance for type II DM, stopped in July 2024 due to recurrent yeast infections.     Started d-mannose, unsure how much she is taking.     Sexually active with male spouse. No changes. Does not correlate sexual activity with UTIs         Review of Systems   All other systems reviewed and are negative.      Objective   Physical Exam  Vitals reviewed.     Constitutional: Patient generally appears stated age. Good nutrition. No deformities. Good attention to grooming.  Neck: No neck masses. Good symmetry. No crepitus. Normal thyroid size and consistency with no masses.  Respiratory: Normal respiratory effort. No intercostal retractions. No use of accessory muscles.  Cardiovascular: Examination of the peripheral vascular system reveals no swelling or varicosities with good pulses. Normal extremity temperature, no edema or tenderness.  Abdomen: Examination of the abdomen reveals no masses or tenderness. No hernias detected. Normal liver and spleen size.   Lymphatic: No palpable lymph nodes in the neck, axilla, groin or other locations  Skin: Inspection of the skin reveals no rashes, lesions, ulcers.  Neurologic/Psychiatric: Oriented to time, place and person. Normal mood and affect with no depression, anxiety, or agitation.    Office Visit on 12/16/2024   Component Date Value Ref Range Status    POC Color, Urine 12/16/2024 Yellow  Straw, Yellow, Light-Yellow Final    POC Appearance, Urine 12/16/2024 Clear  Clear Final    POC Glucose, Urine 12/16/2024 NEGATIVE  NEGATIVE  mg/dl Final    POC Bilirubin, Urine 12/16/2024 NEGATIVE  NEGATIVE Final    POC Ketones, Urine 12/16/2024 NEGATIVE  NEGATIVE mg/dl Final    POC Specific Gravity, Urine 12/16/2024 1.015  1.005 - 1.035 Final    POC Blood, Urine 12/16/2024 TRACE-Intact (A)  NEGATIVE Final    POC PH, Urine 12/16/2024 5.5  No Reference Range Established PH Final    POC Protein, Urine 12/16/2024 NEGATIVE  NEGATIVE, 30 (1+) mg/dl Final    POC Urobilinogen, Urine 12/16/2024 0.2  0.2, 1.0 EU/DL Final    Poc Nitrite, Urine 12/16/2024 NEGATIVE  NEGATIVE Final    POC Leukocytes, Urine 12/16/2024 TRACE (A)  NEGATIVE Final   Lab on 11/06/2024   Component Date Value Ref Range Status    Glucose 11/06/2024 167 (H)  74 - 99 mg/dL Final    Sodium 11/06/2024 139  136 - 145 mmol/L Final    Potassium 11/06/2024 4.5  3.5 - 5.3 mmol/L Final    Chloride 11/06/2024 103  98 - 107 mmol/L Final    Bicarbonate 11/06/2024 25  21 - 32 mmol/L Final    Anion Gap 11/06/2024 16  10 - 20 mmol/L Final    Urea Nitrogen 11/06/2024 15  6 - 23 mg/dL Final    Creatinine 11/06/2024 0.50  0.50 - 1.05 mg/dL Final    eGFR 11/06/2024 >90  >60 mL/min/1.73m*2 Final    Calculations of estimated GFR are performed using the 2021 CKD-EPI Study Refit equation without the race variable for the IDMS-Traceable creatinine methods.  https://jasn.asnjournals.org/content/early/2021/09/22/ASN.3850733353    Calcium 11/06/2024 9.6  8.6 - 10.6 mg/dL Final    Albumin 11/06/2024 4.4  3.4 - 5.0 g/dL Final    Alkaline Phosphatase 11/06/2024 72  33 - 110 U/L Final    Total Protein 11/06/2024 7.9  6.4 - 8.2 g/dL Final    AST 11/06/2024 38  9 - 39 U/L Final    Bilirubin, Total 11/06/2024 0.4  0.0 - 1.2 mg/dL Final    ALT 11/06/2024 44  7 - 45 U/L Final    Patients treated with Sulfasalazine may generate falsely decreased results for ALT.   Lab on 10/16/2024   Component Date Value Ref Range Status    Cholesterol 10/16/2024 188  0 - 199 mg/dL Final          Age      Desirable   Borderline High   High      0-19 Y     0 - 169       170 - 199     >/= 200    20-24 Y     0 - 189       190 - 224     >/= 225         >24 Y     0 - 199       200 - 239     >/= 240   **All ranges are based on fasting samples. Specific   therapeutic targets will vary based on patient-specific   cardiac risk.    Pediatric guidelines reference:Pediatrics 2011, 128(S5).Adult guidelines reference: NCEP ATPIII Guidelines,SALMA 2001, 258:2486-99    Venipuncture immediately after or during the administration of Metamizole may lead to falsely low results. Testing should be performed immediately prior to Metamizole dosing.    HDL-Cholesterol 10/16/2024 44.3  mg/dL Final      Age       Very Low   Low     Normal    High    0-19 Y    < 35      < 40     40-45     ----  20-24 Y    ----     < 40      >45      ----        >24 Y      ----     < 40     40-60      >60      Cholesterol/HDL Ratio 10/16/2024 4.2   Final      Ref Values  Desirable  < 3.4  High Risk  > 5.0    LDL Calculated 10/16/2024 87  <=99 mg/dL Final                                Near   Borderline      AGE      Desirable  Optimal    High     High     Very High     0-19 Y     0 - 109     ---    110-129   >/= 130     ----    20-24 Y     0 - 119     ---    120-159   >/= 160     ----      >24 Y     0 -  99   100-129  130-159   160-189     >/=190      VLDL 10/16/2024 56 (H)  0 - 40 mg/dL Final    Triglycerides 10/16/2024 282 (H)  0 - 149 mg/dL Final       Age         Desirable   Borderline High   High     Very High   0 D-90 D    19 - 174         ----         ----        ----  91 D- 9 Y     0 -  74        75 -  99     >/= 100      ----    10-19 Y     0 -  89        90 - 129     >/= 130      ----    20-24 Y     0 - 114       115 - 149     >/= 150      ----         >24 Y     0 - 149       150 - 199    200- 499    >/= 500    Venipuncture immediately after or during the administration of Metamizole may lead to falsely low results. Testing should be performed immediately prior to Metamizole dosing.    Non  HDL Cholesterol 10/16/2024 144  0 - 149 mg/dL Final          Age       Desirable   Borderline High   High     Very High     0-19 Y     0 - 119       120 - 144     >/= 145    >/= 160    20-24 Y     0 - 149       150 - 189     >/= 190      ----         >24 Y    30 mg/dL above LDL Cholesterol goal     Office Visit on 10/16/2024   Component Date Value Ref Range Status    POC HEMOGLOBIN A1c 10/16/2024 8.1 (A)  4.2 - 6.5 % Final   Lab on 10/03/2024   Component Date Value Ref Range Status    Color, Urine 10/03/2024 Light-Yellow  Light-Yellow, Yellow, Dark-Yellow Final    Appearance, Urine 10/03/2024 Turbid (N)  Clear Final    Specific Gravity, Urine 10/03/2024 1.010  1.005 - 1.035 Final    pH, Urine 10/03/2024 5.5  5.0, 5.5, 6.0, 6.5, 7.0, 7.5, 8.0 Final    Protein, Urine 10/03/2024 NEGATIVE  NEGATIVE, 10 (TRACE), 20 (TRACE) mg/dL Final    Glucose, Urine 10/03/2024 Normal  Normal mg/dL Final    Blood, Urine 10/03/2024 NEGATIVE  NEGATIVE Final    Ketones, Urine 10/03/2024 NEGATIVE  NEGATIVE mg/dL Final    Bilirubin, Urine 10/03/2024 NEGATIVE  NEGATIVE Final    Urobilinogen, Urine 10/03/2024 Normal  Normal mg/dL Final    Nitrite, Urine 10/03/2024 NEGATIVE  NEGATIVE Final    Leukocyte Esterase, Urine 10/03/2024 500 Abida/µL (A)  NEGATIVE Final    Urine Culture 10/03/2024 >100,000 Escherichia coli (A)   Final    WBC, Urine 10/03/2024 >50 (A)  1-5, NONE /HPF Final    RBC, Urine 10/03/2024 6-10 (A)  NONE, 1-2, 3-5 /HPF Final    Bacteria, Urine 10/03/2024 1+ (A)  NONE SEEN /HPF Final         Assessment/Plan   Diagnoses and all orders for this visit:  Recurrent UTI  -     Referral to Urology  -     Post-Void Residual  -     POCT UA Automated manually resulted  -     estradiol (Estrace) 0.01 % (0.1 mg/gram) vaginal cream; Apply pea sized amount into vagina nightly for 2 weeks, then 3 times per week.  -     Urinalysis with Reflex Culture and Microscopic; Standing    Discussed measures for UTI prevention. Encouraged to continue on  d-mannose. Will start estradiol, for prevention, discussed rationale. Plan for 6 month follow up or sooner if needed.        Akua Bell, VIANCA-CNP 12/16/24 2:27 PM

## 2024-12-21 DIAGNOSIS — H20.9 IRITIS: ICD-10-CM

## 2024-12-26 DIAGNOSIS — E11.9 TYPE 2 DIABETES MELLITUS WITHOUT COMPLICATION, WITH LONG-TERM CURRENT USE OF INSULIN (MULTI): Primary | ICD-10-CM

## 2024-12-26 DIAGNOSIS — Z79.4 TYPE 2 DIABETES MELLITUS WITHOUT COMPLICATION, WITH LONG-TERM CURRENT USE OF INSULIN (MULTI): Primary | ICD-10-CM

## 2024-12-26 RX ORDER — TIRZEPATIDE 10 MG/.5ML
10 INJECTION, SOLUTION SUBCUTANEOUS WEEKLY
Qty: 2 ML | Refills: 6 | Status: SHIPPED | OUTPATIENT
Start: 2024-12-26

## 2025-01-05 RX ORDER — TIMOLOL MALEATE 5 MG/ML
1 SOLUTION/ DROPS OPHTHALMIC 2 TIMES DAILY
Qty: 15 ML | Refills: 3 | Status: SHIPPED | OUTPATIENT
Start: 2025-01-05 | End: 2026-01-05

## 2025-01-08 DIAGNOSIS — E11.9 TYPE 2 DIABETES MELLITUS WITHOUT COMPLICATION, WITHOUT LONG-TERM CURRENT USE OF INSULIN (MULTI): ICD-10-CM

## 2025-01-08 RX ORDER — METFORMIN HYDROCHLORIDE 500 MG/1
TABLET, EXTENDED RELEASE ORAL
Qty: 120 TABLET | Refills: 3 | Status: SHIPPED | OUTPATIENT
Start: 2025-01-08

## 2025-01-09 ENCOUNTER — OFFICE VISIT (OUTPATIENT)
Dept: PRIMARY CARE | Facility: CLINIC | Age: 55
End: 2025-01-09
Payer: COMMERCIAL

## 2025-01-09 VITALS
OXYGEN SATURATION: 99 % | BODY MASS INDEX: 39.27 KG/M2 | DIASTOLIC BLOOD PRESSURE: 70 MMHG | HEART RATE: 92 BPM | TEMPERATURE: 97.6 F | WEIGHT: 230 LBS | HEIGHT: 64 IN | SYSTOLIC BLOOD PRESSURE: 130 MMHG

## 2025-01-09 DIAGNOSIS — J45.20 MILD INTERMITTENT ASTHMA, UNSPECIFIED WHETHER COMPLICATED (HHS-HCC): ICD-10-CM

## 2025-01-09 DIAGNOSIS — E11.9 TYPE 2 DIABETES MELLITUS WITHOUT COMPLICATION, WITH LONG-TERM CURRENT USE OF INSULIN (MULTI): ICD-10-CM

## 2025-01-09 DIAGNOSIS — I10 PRIMARY HYPERTENSION: ICD-10-CM

## 2025-01-09 DIAGNOSIS — Z79.4 TYPE 2 DIABETES MELLITUS WITHOUT COMPLICATION, WITH LONG-TERM CURRENT USE OF INSULIN (MULTI): ICD-10-CM

## 2025-01-09 DIAGNOSIS — J01.00 ACUTE NON-RECURRENT MAXILLARY SINUSITIS: Primary | ICD-10-CM

## 2025-01-09 PROCEDURE — 3075F SYST BP GE 130 - 139MM HG: CPT | Performed by: INTERNAL MEDICINE

## 2025-01-09 PROCEDURE — 99214 OFFICE O/P EST MOD 30 MIN: CPT | Performed by: INTERNAL MEDICINE

## 2025-01-09 PROCEDURE — 3078F DIAST BP <80 MM HG: CPT | Performed by: INTERNAL MEDICINE

## 2025-01-09 PROCEDURE — 4010F ACE/ARB THERAPY RXD/TAKEN: CPT | Performed by: INTERNAL MEDICINE

## 2025-01-09 PROCEDURE — 3008F BODY MASS INDEX DOCD: CPT | Performed by: INTERNAL MEDICINE

## 2025-01-09 RX ORDER — DOXYCYCLINE 100 MG/1
100 CAPSULE ORAL 2 TIMES DAILY
Qty: 20 CAPSULE | Refills: 0 | Status: SHIPPED | OUTPATIENT
Start: 2025-01-09 | End: 2025-01-19

## 2025-01-09 NOTE — PROGRESS NOTES
Subjective   Patient ID: Daniela Neri is a 54 y.o. female who presents for evaluation of UR Sx that have been present since 1/1/25  She is the patient of Dr Alanna Smith MD       She began having Sx on 1/1/25 with nasal congestion  Then on 1/4/25 she developed a productive cough and laryngitis  Her cough is both productive and dry with thick mucus production  She has been afebrile. She has been doing sinus rinses 2- 3 times a day and taking Nyquil  She had the influenza vaccine 8/28/24 and pneumococcal conjugate 11/5/24  She was able to get up a lot of mucous today and her voice sounds better   She has not been around sick contacts to her knowledge   She had not tested for COVID   She tends to have issues with her sinuses but not usually bronchitis      HYPERTENSION is stable and not caused elevation or palpitations     The DM is stable and no issues with the URI and eating normally and keeping hydrated            Review of Systems  All systems negative except those listed in the HPI      Objective   Visit Vitals  /70 (BP Location: Left arm, Patient Position: Sitting, BP Cuff Size: Adult)   Pulse 92   Temp 36.4 °C (97.6 °F) (Temporal)    Body mass index is 39.48 kg/m².     Physical Exam  Vitals reviewed.   Constitutional:       Appearance: Normal appearance. She is obese.   HENT:      Head: Normocephalic.      Right Ear: Tympanic membrane, ear canal and external ear normal.      Left Ear: Tympanic membrane, ear canal and external ear normal.      Nose: Nose normal.      Mouth/Throat:      Pharynx: Oropharynx is clear.   Eyes:      Conjunctiva/sclera: Conjunctivae normal.   Cardiovascular:      Rate and Rhythm: Normal rate and regular rhythm.      Pulses: Normal pulses.      Heart sounds: Normal heart sounds.   Pulmonary:      Effort: Pulmonary effort is normal.      Breath sounds: Normal breath sounds.      Comments: + bronchial rhonchi, no wheezes noted   Abdominal:      General: Bowel sounds are normal.       Palpations: Abdomen is soft.   Musculoskeletal:         General: Normal range of motion.      Cervical back: Normal range of motion and neck supple.   Skin:     General: Skin is warm.   Neurological:      General: No focal deficit present.      Mental Status: She is alert and oriented to person, place, and time.   Psychiatric:         Mood and Affect: Mood normal.         Behavior: Behavior normal.         Thought Content: Thought content normal.         Judgment: Judgment normal.       Assessment/Plan   Problem List Items Addressed This Visit       Asthma     Other Visit Diagnoses       Acute non-recurrent maxillary sinusitis    -  Primary    Relevant Medications    doxycycline (Vibramycin) 100 mg capsule           Follow up completed    She is the patient of Dr Alanna Smith MD       Acute maxillary sinusitis : On exam: + bronchial rhonchi, no wheezes noted 1/25  She began having Sx on 1/1/25 with nasal congestion  Then on 1/4/25 she developed a productive cough and laryngitis  Her cough is both productive and dry with thick mucus production  She has been afebrile. She has been doing sinus rinses 2- 3 times a day and taking Nyquil  She had the influenza vaccine 8/28/24 and pneumococcal conjugate 11/5/24   She was able to get up a lot of mucous today and her voice sounds better   She has not been around sick contacts to her knowledge   She had not tested for COVID   She tends to have issues with her sinuses but not usually bronchitis   Recommend she begin Flonase NS daily   Prescription sent in for doxycycline 200 mg BID for 10 days, possible side effects discussed with medication. Recommend she take the Abx with food and a full glass of water.   She defers Tessalon Perles for now. She can call for Rx if she needs it later   Recommend taking Mucinex daily for a couple days to help move the mucous   Encouraged rest and increased hydration with warm/ tepid fluids  Recommend humidifier use at night during  winter months (Oct- Mar)   She will call if Sx persist or worsen     HYPERTENSION is well controlled     The DM is stable and her sugars are not worse   She has kept well hydrated and no GI affects so eating normally   No hypoglycemia       Keep scheduled appt in 5/25 with Dr Alanna Smith MD or sooner if needed  RTC prn with me     Scribe Attestation  By signing my name below, I, Mirtha Santacruz , Scrmario   attest that this documentation has been prepared under the direction and in the presence of Celia Vides MD.

## 2025-01-20 ENCOUNTER — APPOINTMENT (OUTPATIENT)
Dept: OPHTHALMOLOGY | Facility: CLINIC | Age: 55
End: 2025-01-20
Payer: COMMERCIAL

## 2025-01-20 DIAGNOSIS — E11.9 TYPE 2 DIABETES MELLITUS WITHOUT COMPLICATION, WITHOUT LONG-TERM CURRENT USE OF INSULIN (MULTI): Primary | ICD-10-CM

## 2025-01-20 PROCEDURE — 4010F ACE/ARB THERAPY RXD/TAKEN: CPT | Performed by: OPHTHALMOLOGY

## 2025-01-20 PROCEDURE — 92134 CPTRZ OPH DX IMG PST SGM RTA: CPT | Performed by: OPHTHALMOLOGY

## 2025-01-20 PROCEDURE — 99214 OFFICE O/P EST MOD 30 MIN: CPT | Performed by: OPHTHALMOLOGY

## 2025-01-20 ASSESSMENT — TONOMETRY
IOP_METHOD: GOLDMANN APPLANATION
OS_IOP_MMHG: 17
OD_IOP_MMHG: 18

## 2025-01-20 ASSESSMENT — VISUAL ACUITY
OD_CC: 20/20
OS_CC: 20/20
OS_CC+: -2
METHOD: SNELLEN - LINEAR

## 2025-01-20 ASSESSMENT — CUP TO DISC RATIO
OS_RATIO: .2
OD_RATIO: .2

## 2025-01-20 ASSESSMENT — EXTERNAL EXAM - LEFT EYE: OS_EXAM: NORMAL

## 2025-01-20 ASSESSMENT — SLIT LAMP EXAM - LIDS
COMMENTS: GOOD POSITION
COMMENTS: GOOD POSITION

## 2025-01-20 ASSESSMENT — ENCOUNTER SYMPTOMS
ENDOCRINE NEGATIVE: 1
ENDOCRINE COMMENTS: DIABETES
EYES NEGATIVE: 1

## 2025-01-20 ASSESSMENT — EXTERNAL EXAM - RIGHT EYE: OD_EXAM: NORMAL

## 2025-01-20 NOTE — PROGRESS NOTES
Assessment/Plan       Diagnoses and all orders for this visit:  Type 2 diabetes mellitus without complication, without long-term current use of insulin (Multi)  -     OCT, Retina - OU - Both Eyes        Assessment/Plan   Diagnoses and all orders for this visit:  Presbyopia  New spec rx released today per patient request. Ocular health wnl for age OU. Monitor 1 year or sooner prn. Refraction billed today.    Seronegative spondyloarthropathy  Sarcoid  Iritis  -     prednisoLONE acetate (Pred-Forte) 1 % ophthalmic suspension; Administer 1 drop into both eyes 3 times a day for 14 days.  -     timolol (Timoptic) 0.5 % ophthalmic solution; Administer 1 drop into both eyes 2 times a day.  Discussed. Iritis reactivated today. Concern that PS is getting worse. Will consult Dr. Lowery to see if a synechialysis may be indicated.   Could not break in the office w/ mydriatics today.     Type 2 diabetes mellitus without complication, without long-term current use of insulin (Multi)  The patient has diabetes without any evidence of retinopathy.  The patient was advised to maintain tight glucose control, tight blood pressure control, and favorable levels of cholesterol, low density lipoprotein, and high density lipoproteins.  Follow up in one year was recommended.    ROSITA Grande diagnosed Sarcoidosis    She will needs an fluorescein angiography (FA)     [facility]    DIAGNOSTIC PROCEDURE DONE    OCT DONE OD/OS            REASON FOR TEST: will help address and tailor  therapy by detecting subclinical CME SRF     Hi quality OCT  scans obtained  signal good    OCT OD - Normal Foveal Contour, No Edema, IS/OS Junction Normal  OCT OS - Normal Foveal Contour, No Edema, IS/OS Junction Normal    additional commnents:      Fu for fluorescein angiography (FA)   Reduce to every day PF  QDT12 bid    PROCEDURE: FUNDUS FLUORESCEIN ANGIOGRAPHY      After informed consent, fluorescein angiogram was performed      Site     Interpretation    Macula no  Patient was notified that script was done.   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

## 2025-02-10 ENCOUNTER — APPOINTMENT (OUTPATIENT)
Dept: RHEUMATOLOGY | Facility: CLINIC | Age: 55
End: 2025-02-10
Payer: COMMERCIAL

## 2025-02-10 VITALS
RESPIRATION RATE: 16 BRPM | HEIGHT: 64 IN | TEMPERATURE: 97.9 F | WEIGHT: 227.8 LBS | BODY MASS INDEX: 38.89 KG/M2 | OXYGEN SATURATION: 99 % | SYSTOLIC BLOOD PRESSURE: 121 MMHG | DIASTOLIC BLOOD PRESSURE: 81 MMHG | HEART RATE: 64 BPM

## 2025-02-10 DIAGNOSIS — E83.52 HYPERCALCEMIA: Primary | ICD-10-CM

## 2025-02-10 DIAGNOSIS — D86.9 SARCOIDOSIS: ICD-10-CM

## 2025-02-10 DIAGNOSIS — E66.01 SEVERE OBESITY (BMI 35.0-39.9) WITH COMORBIDITY (MULTI): ICD-10-CM

## 2025-02-10 PROCEDURE — 3008F BODY MASS INDEX DOCD: CPT | Performed by: INTERNAL MEDICINE

## 2025-02-10 PROCEDURE — 3079F DIAST BP 80-89 MM HG: CPT | Performed by: INTERNAL MEDICINE

## 2025-02-10 PROCEDURE — 4010F ACE/ARB THERAPY RXD/TAKEN: CPT | Performed by: INTERNAL MEDICINE

## 2025-02-10 PROCEDURE — 99214 OFFICE O/P EST MOD 30 MIN: CPT | Performed by: INTERNAL MEDICINE

## 2025-02-10 PROCEDURE — 3074F SYST BP LT 130 MM HG: CPT | Performed by: INTERNAL MEDICINE

## 2025-02-10 PROCEDURE — 1036F TOBACCO NON-USER: CPT | Performed by: INTERNAL MEDICINE

## 2025-02-10 NOTE — PROGRESS NOTES
Subjective   Patient ID: Daniela Neri is a 54 y.o. female who presents for follow-up regarding sarcoidosis.    HPI 53-year-old female here for f/u re: sarcoidosis. This has involved her liver, skin of her scalp, right breast, and retroperitoneal lymph nodes. She also had hypercalcemia and abdominal pain November 2020. (calcium 14.4)  She was treated with gradual prednisone taper.  Prednisone was reduced to 2.5 mg daily December 2022.,  Prednisone was stopped 3/23.    She currently denies joint pain.  She denies any recurrence of rashes.    She does note some pain over the right buttock that started 5 to 7 days ago.  She describes it as feeling like spasms.  She denies radicular symptoms.  She denies bowel or bladder complaints.  She denies fever or chills.  She takes Advil occasionally which helps.    She is still using prednisone eyedrops for her iritis, but prednisone eyedrop has now been reduced to every other day.  Last eye exam 1/20/25 showed no evidence of active uveitis.  Angiogram January 2025 showed mild nonproliferative diabetic retinopathy.     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    Prior to this, last episode of iritis was 2005.       Following is a summary of biopsies:   -Right breast biopsy 3/20: Nonnecrotizing granulomatous inflammation with giant cell reaction.   -Scalp biopsy November 2020: Granulomatous inflammation.   -Liver biopsy October 2020: Nonnecrotizing granulomas with associated fibrosis, mild portal inflammation with preserved bile ducts   -Retroperitoneal lymph node biopsy September 2020: Nonnecrotizing granulomas. Stains for AFB and fungus were negative.     The patient first recalls being hospitalized in the sixth grade when she had mononucleosis. She actually had a bone marrow biopsy at that time, and required IV feedings for a period of time.     She developed symptoms of joint pain when she was in the ninth grade. Sometimes she had neck pain or swollen toe or  finger.     She had several episodes of iritis when she was in her 30s, but has not had iritis since her 30s. (Last episode 2005).    She was seen by me November 2019 because of polyclonal gammopathy. She had a mildly elevated alkaline phosphatase of 184, with mildly elevated liver transaminases. Testing for hepatitis A, B and C was negative. Labs were done at that time which showed a normal serum ACE level, negative HLA-B27, negative citrulline antibody, negative rheumatoid factor, negative antimitochondrial antibody, negative smooth muscle antibody, negative Lyme JUAN CARLOS.     The patient also developed some abdominal pain August 2020, along with increasing liver enzymes. CT of the abdomen and done August 2020 showed marked splenomegaly, enlarged liver with lobulated lobulated contour, moderate mesenteric and retroperitoneal lymphadenopathy, and wall thickening of the proximal duodenum.     Duodenal biopsy done December 2020 was unremarkable. Gastric biopsy was unremarkable. Colon biopsy was unremarkable. Liver biopsy done October 2020 showed well-formed nonnecrotizing granulomas with associated fibrosis, mild portal inflammation with preserved bile ducts, and no fibrosis otherwise demonstrated.     Retroperitoneal lymph node biopsy done September 2020 showed nonnecrotizing granulomas involving fibrous and lymphoid tissue. Stains for AFB and fungus were negative.     The patient was hospitalized November 19, 2020 through November 22, 2020 with nausea and vomiting. She was also found to have hypercalcemia, calcium 14.4. Her alkaline phosphatase had increased as high as 475 June 2020. She was started on prednisone 40 mg daily early 12/20 for presumptive diagnosis of sarcoid.  Prednisone was tapered to 20 mg daily 3/02/21.    Other specialists involved in her care include Dr. Almaraz (hepatology) and Dr. Gutierrez (hematology), as well as Dr. Garcia in dermatology.  They agree the diagnosis of sarcoidosis.     TYE July  "2021 was normal.     Labs 1/24: CMP normal excepty glucose 203, AST 54 (9-39), ALT 68 (7-45)  Labs 6/24: Hemoglobin A1c 10.6, TSH 1.9, 25-hydroxy vitamin D 42  Labs July 2024: ANCA negative, urine albumin to creatinine ratio 19.7  Labs August 2024: CMP normal except ALT 76 (normal 7-45) and glucose 219, CBC normal, ESR 18  Labs November 2024: CMP normal except glucose 167 (calcium is normal at 9.6)     Medical problem list:   - h/o seronegative spondyloarthropathy (started 9th grade- had neck stiffness, \"sausage digits\" intermittently.   - h/o iritis- last episode 2005   - type 2 DM   - essential hypertension   - depression   - h/o post viral radiculopathy (occurred in her 30s)- occurred in her 30s- Hospitalized for 5 days, had extensive evaluation by neurology including brain MRI, left with right facial muscle weakness   - History of mononucleosis in sixth grade- had bone marrow biopsy.   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  Surgeries: none.     Social history: , no children.   Quit smoking 2009.   Occupation: .     Family history: Mother- ITP   No family h/o ankylosing spondylitis, psoriasis, ulcerative colitis, Crohn's disease.    Health maintenance:  Flu shot November 9, 2023  Pfizer COVID-vaccine November 9, 2023          ROS:  General: Denies fevers or chills.  CV: Denies chest pain or palpitations.  Denies leg edema.  Lungs: Denies coughing or shortness of breath.  Skin: Denies rashes or nodules.  MS: Denies joint pain or joint swelling.     Objective   /81 (BP Location: Left arm, Patient Position: Sitting, BP Cuff Size: Adult)   Pulse 64   Temp 36.6 °C (97.9 °F) (Skin)   Resp 16   Ht 1.626 m (5' 4\")   Wt 103 kg (227 lb 12.8 " oz)   SpO2 99%   BMI 39.10 kg/m²     Physical Exam  General appearance: Well-nourished well-appearing.  HEENT: PERRL, EOMI  Neck: Supple, no nodes.  CV: RRR, no MGR.  Lungs: Clear, no rales or wheezes.  Abdomen: Soft, nontender. No hepatosplenomegaly.  Extremities:  No cyanosis, clubbing, or edema.  MS: No synovitis.   Skin: No rashes or nodules.      Assessment/Plan   Problem List Items Addressed This Visit             ICD-10-CM    Hypercalcemia - Primary E83.52    Relevant Orders    Comprehensive metabolic panel    Sarcoidosis D86.9    Relevant Orders    Comprehensive metabolic panel    BMI 39.0-39.9,adult Z68.39    Severe obesity (BMI 35.0-39.9) with comorbidity (Multi) E66.01       1. Sarcoidosis- manifested by elevated liver enzymes (especially alkaline phosphatase), hypercalcemia, rash in scalp, hepatosplenomegaly with retroperitoneal lymphadenopathy, history of iritis in her 30s.  Now has a recurrence of anterior uveitis, left eye more than the right eye.  She is currently on prednisone 1% eyedrops-she is now taking eyedrop every other day.  Eye exam 1/15 by Dr. Lowery did not show active uveitis.     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 She also had a facial weakness 2001 (mostly on the right side)-this was diagnosed by her neurologist as being post viral polyradiculopathy after an extensive evaluation, including MRI of the brain. I suspect that this may have been a Bell's palsy related to sarcoid as well.     Confirmatory biopsies include breast biopsy March 2020, scalp biopsy November 2020, liver biopsy October 2020, retroperitoneal lymph node biopsy September 2020. T spot 9/20 was negative. Stains for AFB and fungus have been negative.     Her predominant manifestations that  required treatment in thre past were hypercalcemia and abdominal pain.  Her calcium is currently normal and she is not having abdominal pain.     Prednisone was stopped March 2023 with no recurrence of her previous symptoms.    If she continues to have episodes of iritis, I would consider adding Humira or Remicade.    2. Type 2 diabetes-she will continue follow-up with Kiera Tariq for management. Hemoglobin A1c is currently 8.1 October 2024.  Starting mounjaro.     3. BMI 39-stable, will continue to work on weight loss. Also starting Mounjaro.    4.  Adhesive capsulitis right shoulder-doing better with swimming.    5. Recurrent UTI- saw urology  12/16/24. Added D-mannose.    6.  Lumbar strain-symptoms started 5 to 7 days ago on the right.  Recommend apply heat.  She was given a handout with stretching exercises.    Plan:  Check labs: CMP.   Try heat for your back.  Work on back stretching as advised.  Follow-up in 3-4 months.

## 2025-02-10 NOTE — PATIENT INSTRUCTIONS
Check labs: CMP.   Try heat for your back.  Work on back stretching as advised.  Follow-up in 3-4 months.

## 2025-02-18 ENCOUNTER — APPOINTMENT (OUTPATIENT)
Dept: ENDOCRINOLOGY | Facility: CLINIC | Age: 55
End: 2025-02-18
Payer: COMMERCIAL

## 2025-02-18 VITALS
BODY MASS INDEX: 38.93 KG/M2 | WEIGHT: 228 LBS | TEMPERATURE: 97.3 F | HEART RATE: 83 BPM | SYSTOLIC BLOOD PRESSURE: 110 MMHG | HEIGHT: 64 IN | DIASTOLIC BLOOD PRESSURE: 72 MMHG

## 2025-02-18 DIAGNOSIS — E78.00 HYPERCHOLESTEROLEMIA: ICD-10-CM

## 2025-02-18 DIAGNOSIS — N95.1 PERIMENOPAUSE: ICD-10-CM

## 2025-02-18 DIAGNOSIS — Z79.4 TYPE 2 DIABETES MELLITUS WITH HYPERGLYCEMIA, WITH LONG-TERM CURRENT USE OF INSULIN: Primary | ICD-10-CM

## 2025-02-18 DIAGNOSIS — E11.9 TYPE 2 DIABETES MELLITUS WITHOUT COMPLICATION, UNSPECIFIED WHETHER LONG TERM INSULIN USE (MULTI): ICD-10-CM

## 2025-02-18 DIAGNOSIS — E11.65 TYPE 2 DIABETES MELLITUS WITH HYPERGLYCEMIA, WITH LONG-TERM CURRENT USE OF INSULIN: Primary | ICD-10-CM

## 2025-02-18 LAB — POC HEMOGLOBIN A1C: 7.3 % (ref 4.2–6.5)

## 2025-02-18 PROCEDURE — 4010F ACE/ARB THERAPY RXD/TAKEN: CPT | Performed by: STUDENT IN AN ORGANIZED HEALTH CARE EDUCATION/TRAINING PROGRAM

## 2025-02-18 PROCEDURE — 99214 OFFICE O/P EST MOD 30 MIN: CPT | Performed by: STUDENT IN AN ORGANIZED HEALTH CARE EDUCATION/TRAINING PROGRAM

## 2025-02-18 PROCEDURE — 3008F BODY MASS INDEX DOCD: CPT | Performed by: STUDENT IN AN ORGANIZED HEALTH CARE EDUCATION/TRAINING PROGRAM

## 2025-02-18 PROCEDURE — 3074F SYST BP LT 130 MM HG: CPT | Performed by: STUDENT IN AN ORGANIZED HEALTH CARE EDUCATION/TRAINING PROGRAM

## 2025-02-18 PROCEDURE — G2211 COMPLEX E/M VISIT ADD ON: HCPCS | Performed by: STUDENT IN AN ORGANIZED HEALTH CARE EDUCATION/TRAINING PROGRAM

## 2025-02-18 PROCEDURE — 3078F DIAST BP <80 MM HG: CPT | Performed by: STUDENT IN AN ORGANIZED HEALTH CARE EDUCATION/TRAINING PROGRAM

## 2025-02-18 RX ORDER — INSULIN GLARGINE 100 [IU]/ML
20 INJECTION, SOLUTION SUBCUTANEOUS NIGHTLY
Qty: 18 ML | Refills: 3 | Status: SHIPPED | OUTPATIENT
Start: 2025-02-18 | End: 2026-02-18

## 2025-02-18 NOTE — PATIENT INSTRUCTIONS
Increase Mounjaro to 12.5 mg once weekly.  Monitor BG and weigh if higher let me know  Stop NPH  Start Lantus 18 units daily.  If BG in am frequently less than 80 decrease to 15 units  If BG > 200 increase to 20 units  Restart Metformin  Continue glipizide  If frequent low BG < 80 during the day we will decrease glipizide  Continue rosuvastatin and lisinopril  Check BG 2-3 times a day with freestyle     Blood work today     RTC in 4 months

## 2025-02-18 NOTE — PROGRESS NOTES
"Patient coming in for follow up for T2DM    Subjective   Daniela Neri is a 54 y.o. female who presents for follow up for Type 2 diabetes mellitus.   Lab Results   Component Value Date    HGBA1C 7.3 (A) 2025      Daniela Neri is a 54 y.o. female who presents for follow up for Type 2 diabetes mellitus.   Most recent A1c 8.1% 10/2024  Today 7.3% 2025  Has sarcoidosis stopped prednisone stopped a year ago   Jardiance caused yeast infection   Current diabetes regimen is as follows:  Glipizide 10mg once a day XL at lunch  Novolin 20u in the morning (not taking in evening)  Metformin 2,000mg with dinner stopped for 3 weeks 1st 2 weeks of feb had a sinus infection  Mounjaro 10 mg weekly    Taking supplements - ceylon cinnamon 1200 twice daily and triple omega   Has been having issues with bowels.   No abdominal pain no nausea or vomiting    B today before breakfast   In am: [phone]  In pm: [phone]  Cardiovascular risk factors include diabetes mellitus, dyslipidemia, and obesity (BMI >= 30 kg/m2). The patient is on an ACE inhibitor or angiotensin II receptor blocker.       Foot Exam: No neuropathy  Eye Exam: Mild nonproliferative diabetic retinopathy of both eyes without macular edema associated with type 2 diabetes mellitus Last seen in 2025  Lipid Panel: Rosuvastatin 10 mg LDL: 87 TG were 282  Urine Albumin: lisinopril 20 mg\   mini hot flashes here and there. No period for the past year  Review of Systems  all pertinent systems reviewed and are otherwise negative   Objective   /72 (BP Location: Left arm, Patient Position: Sitting, BP Cuff Size: Large adult)   Pulse 83   Temp 36.3 °C (97.3 °F)   Ht 1.626 m (5' 4\")   Wt 103 kg (228 lb)   BMI 39.14 kg/m²   Physical Exam  Constitutional:       General: She is not in acute distress.     Appearance: Normal appearance.   HENT:      Head: Normocephalic and atraumatic.   Eyes:      Extraocular Movements: Extraocular movements " "intact.      Pupils: Pupils are equal, round, and reactive to light.   Cardiovascular:      Rate and Rhythm: Normal rate and regular rhythm.   Pulmonary:      Effort: Pulmonary effort is normal. No respiratory distress.      Breath sounds: Normal breath sounds.   Abdominal:      General: Bowel sounds are normal.      Palpations: Abdomen is soft.      Tenderness: There is no abdominal tenderness.   Skin:     Coloration: Skin is not jaundiced or pale.      Findings: No erythema or rash.   Neurological:      General: No focal deficit present.      Mental Status: She is alert and oriented to person, place, and time.      Deep Tendon Reflexes: Reflexes normal.   Psychiatric:         Mood and Affect: Mood normal.         Behavior: Behavior normal.         Lab Review  Glucose (mg/dL)   Date Value   11/06/2024 167 (H)   08/16/2024 209 (H)   01/15/2024 203 (H)     POC HEMOGLOBIN A1c (%)   Date Value   02/18/2025 7.3 (A)   10/16/2024 8.1 (A)     Hemoglobin A1C (%)   Date Value   06/18/2024 10.6 (H)   01/15/2024 8.5 (H)   07/14/2023 7.8 (A)   10/08/2022 9.0 (A)   07/29/2022 10.4 (A)     Bicarbonate (mmol/L)   Date Value   11/06/2024 25   08/16/2024 24   01/15/2024 24     Urea Nitrogen (mg/dL)   Date Value   11/06/2024 15   08/16/2024 16   01/15/2024 15     Creatinine (mg/dL)   Date Value   11/06/2024 0.50   08/16/2024 0.52   01/15/2024 0.55     Lab Results   Component Value Date    CHOL 188 10/16/2024    CHOL 188 10/08/2022    CHOL 197 02/25/2022     Lab Results   Component Value Date    HDL 44.3 10/16/2024    HDL 33.0 (A) 10/08/2022    HDL 38.0 (A) 02/25/2022     Lab Results   Component Value Date    LDLCALC 87 10/16/2024     Lab Results   Component Value Date    TRIG 282 (H) 10/16/2024    TRIG 805 (H) 10/08/2022    TRIG 550 (H) 02/25/2022     No components found for: \"CHOLHDL\"   Lab Results   Component Value Date    TSH 1.90 06/18/2024     No results found for: \"ALBUR\", \"[id number]\"     Health Maintenance: "       Assessment/Plan   Daniela Neri is a 54 y.o. female who presents for follow up for Type 2 diabetes mellitus.   Today 7.3% 2025  Has sarcoidosis stopped prednisone stopped a year ago  Jardiance caused yeast infection   Current diabetes regimen is as follows:  Glipizide 10mg once a day XL at lunch  Novolin 20u in the morning (not taking in evening)  Metformin 2,000mg with dinner stopped for 3 weeks 1st 2 weeks of feb had a sinus infection  Mounjaro 10 mg weekly    Taking supplements - ceylon cinnamon 1200 twice daily and triple omega   Has been having issues with bowels.   B today before breakfast   In am: [phone]  In pm: [phone]  Foot Exam: No neuropathy  Eye Exam: Mild nonproliferative diabetic retinopathy of both eyes without macular edema associated with type 2 diabetes mellitus Last seen in 2025  Lipid Panel: Rosuvastatin 10 mg LDL: 87 TG were 282  Urine Albumin: lisinopril 20 mg\   mini hot flashes here and there. No period for the past year    Plan:  Increase Mounjaro to 12.5 mg once weekly.  Monitor BG and weigh if higher let me know  Stop NPH  Start Lantus 18 units daily.  If BG in am frequently less than 80 decrease to 15 units  If BG > 200 increase to 20 units  Restart Metformin  Continue glipizide  If frequent low BG < 80 during the day we will decrease glipizide  Continue rosuvastatin and lisinopril  Check BG 2-3 times a day with freestyle     Blood work today     RTC in 4 months  Assessment & Plan  Type 2 diabetes mellitus with hyperglycemia, with long-term current use of insulin    Orders:    Lipid Panel; Future    Hemoglobin A1C; Future    Renal Function Panel; Future    insulin glargine (Lantus) 100 unit/mL (3 mL) pen; Inject 20 Units under the skin once daily at bedtime. Take as directed per insulin instructions.    Hypercholesterolemia    Orders:    Lipid Panel; Future    Perimenopause       Time spent  Coordination of care and Plan communicated to PCP  electronically via EMR  Total time spent 33 min in this encounter including chart review, coordination of care, history physical exam, counseling and placing lab orders

## 2025-02-28 LAB
ALBUMIN SERPL-MCNC: 4.5 G/DL (ref 3.6–5.1)
ALP SERPL-CCNC: 62 U/L (ref 37–153)
ALT SERPL-CCNC: 35 U/L (ref 6–29)
ANION GAP SERPL CALCULATED.4IONS-SCNC: 9 MMOL/L (CALC) (ref 7–17)
AST SERPL-CCNC: 21 U/L (ref 10–35)
BILIRUB SERPL-MCNC: 0.4 MG/DL (ref 0.2–1.2)
BUN SERPL-MCNC: 14 MG/DL (ref 7–25)
CALCIUM SERPL-MCNC: 9.7 MG/DL (ref 8.6–10.4)
CHLORIDE SERPL-SCNC: 104 MMOL/L (ref 98–110)
CO2 SERPL-SCNC: 25 MMOL/L (ref 20–32)
CREAT SERPL-MCNC: 0.52 MG/DL (ref 0.5–1.03)
EGFRCR SERPLBLD CKD-EPI 2021: 110 ML/MIN/1.73M2
GLUCOSE SERPL-MCNC: 165 MG/DL (ref 65–99)
POTASSIUM SERPL-SCNC: 4.9 MMOL/L (ref 3.5–5.3)
PROT SERPL-MCNC: 8 G/DL (ref 6.1–8.1)
SODIUM SERPL-SCNC: 138 MMOL/L (ref 135–146)

## 2025-03-25 NOTE — ASSESSMENT & PLAN NOTE
Orders:    Lipid Panel; Future    Hemoglobin A1C; Future    Renal Function Panel; Future    insulin glargine (Lantus) 100 unit/mL (3 mL) pen; Inject 20 Units under the skin once daily at bedtime. Take as directed per insulin instructions.

## 2025-04-02 DIAGNOSIS — Z79.4 TYPE 2 DIABETES MELLITUS WITH HYPERGLYCEMIA, WITH LONG-TERM CURRENT USE OF INSULIN: Primary | ICD-10-CM

## 2025-04-02 DIAGNOSIS — E11.65 TYPE 2 DIABETES MELLITUS WITH HYPERGLYCEMIA, WITH LONG-TERM CURRENT USE OF INSULIN: Primary | ICD-10-CM

## 2025-04-02 RX ORDER — TIRZEPATIDE 12.5 MG/.5ML
12.5 INJECTION, SOLUTION SUBCUTANEOUS
Qty: 2 ML | Refills: 3 | Status: SHIPPED | OUTPATIENT
Start: 2025-04-02

## 2025-04-05 DIAGNOSIS — E78.00 HYPERCHOLESTEROLEMIA: ICD-10-CM

## 2025-04-05 DIAGNOSIS — I10 PRIMARY HYPERTENSION: ICD-10-CM

## 2025-04-06 DIAGNOSIS — E11.9 TYPE 2 DIABETES MELLITUS WITHOUT COMPLICATION, WITHOUT LONG-TERM CURRENT USE OF INSULIN: ICD-10-CM

## 2025-04-07 RX ORDER — LISINOPRIL 20 MG/1
20 TABLET ORAL DAILY
Qty: 90 TABLET | Refills: 3 | Status: SHIPPED | OUTPATIENT
Start: 2025-04-07

## 2025-04-07 RX ORDER — ROSUVASTATIN CALCIUM 10 MG/1
10 TABLET, COATED ORAL NIGHTLY
Qty: 90 TABLET | Refills: 3 | Status: SHIPPED | OUTPATIENT
Start: 2025-04-07

## 2025-04-07 RX ORDER — METFORMIN HYDROCHLORIDE 500 MG/1
TABLET, EXTENDED RELEASE ORAL
Qty: 360 TABLET | Refills: 1 | Status: SHIPPED | OUTPATIENT
Start: 2025-04-07

## 2025-04-21 ENCOUNTER — APPOINTMENT (OUTPATIENT)
Dept: OPHTHALMOLOGY | Facility: CLINIC | Age: 55
End: 2025-04-21
Payer: COMMERCIAL

## 2025-04-21 DIAGNOSIS — D86.9 SARCOIDOSIS: ICD-10-CM

## 2025-04-21 DIAGNOSIS — H20.9 IRITIS: ICD-10-CM

## 2025-04-21 DIAGNOSIS — D89.0 POLYCLONAL GAMMOPATHY: ICD-10-CM

## 2025-04-21 DIAGNOSIS — E11.9 TYPE 2 DIABETES MELLITUS WITHOUT COMPLICATION, WITHOUT LONG-TERM CURRENT USE OF INSULIN: Primary | ICD-10-CM

## 2025-04-21 PROCEDURE — 92134 CPTRZ OPH DX IMG PST SGM RTA: CPT | Mod: BILATERAL PROCEDURE | Performed by: OPHTHALMOLOGY

## 2025-04-21 PROCEDURE — 99214 OFFICE O/P EST MOD 30 MIN: CPT | Performed by: OPHTHALMOLOGY

## 2025-04-21 ASSESSMENT — ENCOUNTER SYMPTOMS
ENDOCRINE NEGATIVE: 0
MUSCULOSKELETAL NEGATIVE: 0
ALLERGIC/IMMUNOLOGIC NEGATIVE: 0
EYES NEGATIVE: 1
CARDIOVASCULAR NEGATIVE: 0
CONSTITUTIONAL NEGATIVE: 0
GASTROINTESTINAL NEGATIVE: 0
HEMATOLOGIC/LYMPHATIC COMMENTS: SARCOIDOSIS
RESPIRATORY NEGATIVE: 0
PSYCHIATRIC NEGATIVE: 0
HEMATOLOGIC/LYMPHATIC NEGATIVE: 1
NEUROLOGICAL NEGATIVE: 0

## 2025-04-21 ASSESSMENT — SLIT LAMP EXAM - LIDS
COMMENTS: GOOD POSITION
COMMENTS: GOOD POSITION

## 2025-04-21 ASSESSMENT — EXTERNAL EXAM - RIGHT EYE: OD_EXAM: NORMAL

## 2025-04-21 ASSESSMENT — CUP TO DISC RATIO
OS_RATIO: .2
OD_RATIO: .2

## 2025-04-21 ASSESSMENT — EXTERNAL EXAM - LEFT EYE: OS_EXAM: NORMAL

## 2025-04-21 ASSESSMENT — VISUAL ACUITY
OD_CC: 20/20
METHOD: SNELLEN - LINEAR
CORRECTION_TYPE: GLASSES
OS_CC: 20/20

## 2025-04-21 ASSESSMENT — TONOMETRY
OS_IOP_MMHG: 19
OD_IOP_MMHG: 20
IOP_METHOD: GOLDMANN APPLANATION

## 2025-04-21 NOTE — PROGRESS NOTES
Assessment/Plan       Diagnoses and all orders for this visit:  Type 2 diabetes mellitus without complication, without long-term current use of insulin  -     OCT, Retina - OU - Both Eyes  Iritis  Sarcoidosis  Polyclonal gammopathy        Assessment/Plan   Seronegative spondyloarthropathy  Sarcoid  Iritis  GI Dr diagnosed Sarcoidosis        Type 2 diabetes mellitus without complication, without long-term current use of insulin (Multi)  The patient has diabetes without any evidence of retinopathy.  The patient was advised to maintain tight glucose control, tight blood pressure control, and favorable levels of cholesterol, low density lipoprotein, and high density lipoproteins.  Follow up in one year was recommended.      DIAGNOSTIC PROCEDURE DONE     OCT DONE OD/OS 04/21/25      REASON FOR TEST: will help address and tailor  therapy by detecting subclinical CME SRF     Hi quality OCT  scans obtained  signal good    OCT OD - Normal Foveal Contour, No Edema, IS/OS Junction Normal  OCT OS - Normal Foveal Contour, No Edema, IS/OS Junction Normal    additional commnents:      PROCEDURE: FUNDUS FLUORESCEIN ANGIOGRAPHY8/12/2024       After informed consent, fluorescein angiogram was performed      Site     Interpretation    Macula no leakage both eyes (OU)   Optic nerve well perfused with no leakage both eyes (OU)   Vessels normal in caliber with no leakage both eyes (OU)   MA ou no DME Ou no vascular leakage ou    Impression    The angiogram is consistent with the clinical finding mild non-proliferative diabetic retinopathy (NPDR) both eyes  No active uveitis posterior    This patient MAY need to be placed on immunosuppressives in an effort to save the eye as well as manage the condition from a systemic point of view. There is no complicating pregnancy (if female will use two methods of prevention) or immunosupression associated disease. Risks are significant as well as the benefits. This was carefully discussed with the  patient. The goals of therapy was delineated clearly and need for regular monitoring of systemic toxicty to minimize it was discussed. The absolute need for follow up was discussed.      Plan:   PF every other day OU  Timolol BID OU    Retina FU 3m

## 2025-04-24 DIAGNOSIS — N39.0 RECURRENT UTI: Primary | ICD-10-CM

## 2025-04-24 LAB
APPEARANCE UR: ABNORMAL
BACTERIA #/AREA URNS HPF: ABNORMAL /HPF
BACTERIA UR CULT: ABNORMAL
BACTERIA UR CULT: ABNORMAL
BILIRUB UR QL STRIP: NEGATIVE
CAOX CRY #/AREA URNS HPF: ABNORMAL /HPF
COLOR UR: YELLOW
GLUCOSE UR QL STRIP: NEGATIVE
HGB UR QL STRIP: ABNORMAL
HYALINE CASTS #/AREA URNS LPF: ABNORMAL /LPF
KETONES UR QL STRIP: NEGATIVE
LEUKOCYTE ESTERASE UR QL STRIP: ABNORMAL
NITRITE UR QL STRIP: POSITIVE
PH UR STRIP: ABNORMAL [PH] (ref 5–8)
PROT UR QL STRIP: ABNORMAL
RBC #/AREA URNS HPF: ABNORMAL /HPF
SERVICE CMNT-IMP: ABNORMAL
SP GR UR STRIP: 1.01 (ref 1–1.03)
SQUAMOUS #/AREA URNS HPF: ABNORMAL /HPF
WBC #/AREA URNS HPF: ABNORMAL /HPF

## 2025-04-24 RX ORDER — NITROFURANTOIN 25; 75 MG/1; MG/1
100 CAPSULE ORAL 2 TIMES DAILY
Qty: 14 CAPSULE | Refills: 0 | Status: SHIPPED | OUTPATIENT
Start: 2025-04-24 | End: 2025-05-01

## 2025-05-05 ENCOUNTER — APPOINTMENT (OUTPATIENT)
Dept: PRIMARY CARE | Facility: CLINIC | Age: 55
End: 2025-05-05
Payer: COMMERCIAL

## 2025-05-05 VITALS
BODY MASS INDEX: 38.17 KG/M2 | HEART RATE: 104 BPM | RESPIRATION RATE: 16 BRPM | TEMPERATURE: 98 F | OXYGEN SATURATION: 98 % | HEIGHT: 64 IN | WEIGHT: 223.6 LBS | SYSTOLIC BLOOD PRESSURE: 110 MMHG | DIASTOLIC BLOOD PRESSURE: 60 MMHG

## 2025-05-05 DIAGNOSIS — Z79.4 TYPE 2 DIABETES MELLITUS WITHOUT COMPLICATION, WITH LONG-TERM CURRENT USE OF INSULIN: ICD-10-CM

## 2025-05-05 DIAGNOSIS — D86.9 SARCOIDOSIS: ICD-10-CM

## 2025-05-05 DIAGNOSIS — Z00.00 HEALTHCARE MAINTENANCE: ICD-10-CM

## 2025-05-05 DIAGNOSIS — I10 PRIMARY HYPERTENSION: ICD-10-CM

## 2025-05-05 DIAGNOSIS — E55.9 VITAMIN D DEFICIENCY: ICD-10-CM

## 2025-05-05 DIAGNOSIS — E11.9 TYPE 2 DIABETES MELLITUS WITHOUT COMPLICATION, WITH LONG-TERM CURRENT USE OF INSULIN: ICD-10-CM

## 2025-05-05 DIAGNOSIS — E11.3293 MILD NONPROLIFERATIVE DIABETIC RETINOPATHY OF BOTH EYES ASSOCIATED WITH TYPE 2 DIABETES MELLITUS, MACULAR EDEMA PRESENCE UNSPECIFIED: ICD-10-CM

## 2025-05-05 DIAGNOSIS — E78.2 MIXED HYPERLIPIDEMIA: Primary | ICD-10-CM

## 2025-05-05 DIAGNOSIS — E11.3213 BOTH EYES AFFECTED BY MILD NONPROLIFERATIVE DIABETIC RETINOPATHY WITH MACULAR EDEMA, ASSOCIATED WITH TYPE 2 DIABETES MELLITUS: ICD-10-CM

## 2025-05-05 PROCEDURE — 3078F DIAST BP <80 MM HG: CPT | Performed by: INTERNAL MEDICINE

## 2025-05-05 PROCEDURE — 99214 OFFICE O/P EST MOD 30 MIN: CPT | Performed by: INTERNAL MEDICINE

## 2025-05-05 PROCEDURE — 3051F HG A1C>EQUAL 7.0%<8.0%: CPT | Performed by: INTERNAL MEDICINE

## 2025-05-05 PROCEDURE — 1036F TOBACCO NON-USER: CPT | Performed by: INTERNAL MEDICINE

## 2025-05-05 PROCEDURE — 4010F ACE/ARB THERAPY RXD/TAKEN: CPT | Performed by: INTERNAL MEDICINE

## 2025-05-05 PROCEDURE — 3074F SYST BP LT 130 MM HG: CPT | Performed by: INTERNAL MEDICINE

## 2025-05-05 PROCEDURE — 3008F BODY MASS INDEX DOCD: CPT | Performed by: INTERNAL MEDICINE

## 2025-05-05 ASSESSMENT — ENCOUNTER SYMPTOMS
ARTHRALGIAS: 0
SINUS PRESSURE: 0
POLYPHAGIA: 0
SINUS PAIN: 0
TROUBLE SWALLOWING: 0
SORE THROAT: 0
EYE REDNESS: 0
WEAKNESS: 0
COUGH: 0
BACK PAIN: 0
EYES NEGATIVE: 1
FACIAL ASYMMETRY: 0
SHORTNESS OF BREATH: 0
TREMORS: 0
CHEST TIGHTNESS: 0
DYSURIA: 0
CARDIOVASCULAR NEGATIVE: 1
VOMITING: 0
RESPIRATORY NEGATIVE: 1
CONFUSION: 0
DIZZINESS: 0
EYE PAIN: 0
DIFFICULTY URINATING: 0
JOINT SWELLING: 0
ACTIVITY CHANGE: 0
MUSCULOSKELETAL NEGATIVE: 1
HALLUCINATIONS: 0
ABDOMINAL PAIN: 0
BRUISES/BLEEDS EASILY: 0
DIARRHEA: 0
WOUND: 0
ENDOCRINE NEGATIVE: 1
NERVOUS/ANXIOUS: 0
WHEEZING: 0
POLYDIPSIA: 0
FLANK PAIN: 0
NAUSEA: 0
NECK STIFFNESS: 0
SPEECH DIFFICULTY: 0
COLOR CHANGE: 0
STRIDOR: 0
CONSTIPATION: 0
GASTROINTESTINAL NEGATIVE: 1
NUMBNESS: 0
NEUROLOGICAL NEGATIVE: 1
ADENOPATHY: 0
HEMATOLOGIC/LYMPHATIC NEGATIVE: 1
EYE DISCHARGE: 0
ALLERGIC/IMMUNOLOGIC NEGATIVE: 1
MYALGIAS: 0
AGITATION: 0
UNEXPECTED WEIGHT CHANGE: 0
PALPITATIONS: 0
BLOOD IN STOOL: 0
SEIZURES: 0
VOICE CHANGE: 0
NECK PAIN: 0
SLEEP DISTURBANCE: 0
LIGHT-HEADEDNESS: 0
PSYCHIATRIC NEGATIVE: 1
FREQUENCY: 0
CONSTITUTIONAL NEGATIVE: 1
APPETITE CHANGE: 0
HEADACHES: 0

## 2025-05-05 ASSESSMENT — PATIENT HEALTH QUESTIONNAIRE - PHQ9
1. LITTLE INTEREST OR PLEASURE IN DOING THINGS: NOT AT ALL
2. FEELING DOWN, DEPRESSED OR HOPELESS: NOT AT ALL
SUM OF ALL RESPONSES TO PHQ9 QUESTIONS 1 AND 2: 0

## 2025-05-05 NOTE — PROGRESS NOTES
Subjective   Patient ID: Daniela Neri is a 54 y.o. female who presents for Follow-up (Pt is here due to a 6 month follow up).    HPI     This is 54 yo female with very complex medical history including DM (uncontrolled), HTN, HLD, obesity, fatty liver, asthma, seronegative spondyloarthropathy, sarcoidosis, anxiety.     Patient has been feeling pretty good and has been complaint with prescribed medications.    We reviewed and discussed details of recent blood work: CBC, CMP, HbA1c: 7.3 done in Feb 2025.  Results within acceptable range.    HBA1c was 7.3.     Mammogram in Nov 2024 was neg.      Tolerating Mounjaro (12.5 mg weekly) well.  Started in June 2024.  Lost about 11 lbs within a year.  Follows with endocrinology Dr. Hutton regarding diabetes. She has been still treated with insulin Lantus, Glipizide, Metformin.    Dur to HLD and DM patient ASCVD risk is elevated, we discussed details, advised CTCAC for better risk stratification.     She has been following with Dr. Olmos regarding sarcoidosis and dose of prednisone was gradually reduced, then weaned off.     She remains on prednisone eye drops. Follows with dr. Lowery.        Review of Systems   Constitutional: Negative.  Negative for activity change, appetite change and unexpected weight change.   HENT: Negative.  Negative for congestion, ear discharge, ear pain, hearing loss, mouth sores, nosebleeds, sinus pressure, sinus pain, sore throat, trouble swallowing and voice change.    Eyes: Negative.  Negative for pain, discharge, redness and visual disturbance.   Respiratory: Negative.  Negative for cough, chest tightness, shortness of breath, wheezing and stridor.    Cardiovascular: Negative.  Negative for chest pain, palpitations and leg swelling.   Gastrointestinal: Negative.  Negative for abdominal pain, blood in stool, constipation, diarrhea, nausea and vomiting.   Endocrine: Negative.  Negative for polydipsia, polyphagia and polyuria.   Genitourinary:  "Negative.  Negative for difficulty urinating, dysuria, flank pain, frequency and urgency.   Musculoskeletal: Negative.  Negative for arthralgias, back pain, gait problem, joint swelling, myalgias, neck pain and neck stiffness.   Skin: Negative.  Negative for color change, rash and wound.   Allergic/Immunologic: Negative.  Negative for environmental allergies, food allergies and immunocompromised state.   Neurological: Negative.  Negative for dizziness, tremors, seizures, syncope, facial asymmetry, speech difficulty, weakness, light-headedness, numbness and headaches.   Hematological: Negative.  Negative for adenopathy. Does not bruise/bleed easily.   Psychiatric/Behavioral: Negative.  Negative for agitation, behavioral problems, confusion, hallucinations, sleep disturbance and suicidal ideas. The patient is not nervous/anxious.    All other systems reviewed and are negative.      Objective   /60 (BP Location: Left arm, Patient Position: Sitting, BP Cuff Size: Large adult)   Pulse 104   Temp 36.7 °C (98 °F) (Temporal)   Resp 16   Ht 1.626 m (5' 4\")   Wt 101 kg (223 lb 9.6 oz)   SpO2 98%   BMI 38.38 kg/m²     Physical Exam  Vitals and nursing note reviewed.   Constitutional:       General: She is not in acute distress.     Appearance: Normal appearance.   HENT:      Head: Normocephalic and atraumatic.      Right Ear: External ear normal.      Left Ear: External ear normal.      Nose: Nose normal. No congestion or rhinorrhea.   Eyes:      General:         Right eye: No discharge.         Left eye: No discharge.      Extraocular Movements: Extraocular movements intact.      Conjunctiva/sclera: Conjunctivae normal.      Pupils: Pupils are equal, round, and reactive to light.   Cardiovascular:      Rate and Rhythm: Normal rate and regular rhythm.      Pulses: Normal pulses.      Heart sounds: Normal heart sounds. No murmur heard.     No friction rub. No gallop.   Pulmonary:      Effort: Pulmonary effort is " normal. No respiratory distress.      Breath sounds: Normal breath sounds. No stridor. No wheezing, rhonchi or rales.   Chest:      Chest wall: No tenderness.   Abdominal:      General: Bowel sounds are normal.      Palpations: Abdomen is soft. There is no mass.      Tenderness: There is no abdominal tenderness. There is no guarding or rebound.   Musculoskeletal:         General: No swelling or deformity. Normal range of motion.      Cervical back: Normal range of motion and neck supple. No rigidity or tenderness.      Right lower leg: No edema.      Left lower leg: No edema.   Lymphadenopathy:      Cervical: No cervical adenopathy.   Skin:     General: Skin is warm and dry.      Coloration: Skin is not jaundiced.      Findings: No bruising or erythema.   Neurological:      General: No focal deficit present.      Mental Status: She is alert and oriented to person, place, and time. Mental status is at baseline.      Cranial Nerves: No cranial nerve deficit.      Motor: No weakness.      Coordination: Coordination normal.      Gait: Gait normal.   Psychiatric:         Mood and Affect: Mood normal.         Behavior: Behavior normal.         Thought Content: Thought content normal.         Judgment: Judgment normal.         Assessment/Plan   Problem List Items Addressed This Visit           ICD-10-CM       Cardiac and Vasculature    Hypertension I10    Continue current treatment.          Mixed hyperlipidemia - Primary E78.2    Continue statin.         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 F/up with rheumatology.             Multi-system (Lupus, Sarcoid)    Sarcoidosis D86.9    Clinically stable. F/up with rheumatology.           Other Visit Diagnoses         Codes      Healthcare maintenance     Z00.00    Relevant Orders    CBC    Comprehensive Metabolic Panel    Lipid Panel    TSH with reflex to Free T4 if abnormal          It was a pleasure to see you today.  I would like to remind you about importance of a healthy lifestyle in order to improve your well-being and live longer.  Try to engage in physical activities for at least 150 minutes per week.  Eat about 10 servings of fruits and vegetables daily. My advice is 2 servings of fruits and 8 servings of vegetables.  For vegetables choose at least half of them green and at least half of them fresh.  Please avoid sugar, salt, fried food and saturated fat.    I spent a total of 30 minutes on the date of service for follow up visit, which included preparing to see the patient, face-to-face patient care, completing clinical documentation, obtaining and/or reviewing separately obtained history, performing a medically appropriate examination, counseling and educating the patient/family/caregiver, ordering medications, tests, or procedures, communicating with other health care providers (not separately reported), independently interpreting results (not separately reported), communicating results to the patient/family/caregiver, and care coordination (not separately reported).    F/up in 6 months or sooner if needed.

## 2025-05-07 DIAGNOSIS — Z79.4 TYPE 2 DIABETES MELLITUS WITH HYPERGLYCEMIA, WITH LONG-TERM CURRENT USE OF INSULIN: Primary | ICD-10-CM

## 2025-05-07 DIAGNOSIS — E11.65 TYPE 2 DIABETES MELLITUS WITH HYPERGLYCEMIA, WITH LONG-TERM CURRENT USE OF INSULIN: Primary | ICD-10-CM

## 2025-05-08 DIAGNOSIS — Z79.4 TYPE 2 DIABETES MELLITUS WITH HYPERGLYCEMIA, WITH LONG-TERM CURRENT USE OF INSULIN: Primary | ICD-10-CM

## 2025-05-08 DIAGNOSIS — E11.65 TYPE 2 DIABETES MELLITUS WITH HYPERGLYCEMIA, WITH LONG-TERM CURRENT USE OF INSULIN: Primary | ICD-10-CM

## 2025-05-08 RX ORDER — TIRZEPATIDE 15 MG/.5ML
15 INJECTION, SOLUTION SUBCUTANEOUS
Qty: 2 ML | Refills: 11 | Status: SHIPPED | OUTPATIENT
Start: 2025-05-08

## 2025-05-18 DIAGNOSIS — E78.00 HYPERCHOLESTEROLEMIA: ICD-10-CM

## 2025-05-18 DIAGNOSIS — E11.65 TYPE 2 DIABETES MELLITUS WITH HYPERGLYCEMIA, WITH LONG-TERM CURRENT USE OF INSULIN: ICD-10-CM

## 2025-05-18 DIAGNOSIS — Z79.4 TYPE 2 DIABETES MELLITUS WITH HYPERGLYCEMIA, WITH LONG-TERM CURRENT USE OF INSULIN: ICD-10-CM

## 2025-06-09 ENCOUNTER — APPOINTMENT (OUTPATIENT)
Dept: RHEUMATOLOGY | Facility: CLINIC | Age: 55
End: 2025-06-09
Payer: COMMERCIAL

## 2025-06-09 VITALS
TEMPERATURE: 96.7 F | HEART RATE: 91 BPM | HEIGHT: 64 IN | BODY MASS INDEX: 38.21 KG/M2 | SYSTOLIC BLOOD PRESSURE: 106 MMHG | DIASTOLIC BLOOD PRESSURE: 74 MMHG | OXYGEN SATURATION: 98 % | WEIGHT: 223.8 LBS

## 2025-06-09 DIAGNOSIS — I10 PRIMARY HYPERTENSION: Primary | ICD-10-CM

## 2025-06-09 DIAGNOSIS — M47.819 SERONEGATIVE SPONDYLOARTHROPATHY: ICD-10-CM

## 2025-06-09 DIAGNOSIS — D86.9 SARCOIDOSIS: ICD-10-CM

## 2025-06-09 PROCEDURE — 93000 ELECTROCARDIOGRAM COMPLETE: CPT | Performed by: INTERNAL MEDICINE

## 2025-06-09 PROCEDURE — 4010F ACE/ARB THERAPY RXD/TAKEN: CPT | Performed by: INTERNAL MEDICINE

## 2025-06-09 PROCEDURE — 3078F DIAST BP <80 MM HG: CPT | Performed by: INTERNAL MEDICINE

## 2025-06-09 PROCEDURE — 99214 OFFICE O/P EST MOD 30 MIN: CPT | Performed by: INTERNAL MEDICINE

## 2025-06-09 PROCEDURE — 1036F TOBACCO NON-USER: CPT | Performed by: INTERNAL MEDICINE

## 2025-06-09 PROCEDURE — 3074F SYST BP LT 130 MM HG: CPT | Performed by: INTERNAL MEDICINE

## 2025-06-09 PROCEDURE — 3051F HG A1C>EQUAL 7.0%<8.0%: CPT | Performed by: INTERNAL MEDICINE

## 2025-06-09 PROCEDURE — 3008F BODY MASS INDEX DOCD: CPT | Performed by: INTERNAL MEDICINE

## 2025-06-09 NOTE — PROGRESS NOTES
Subjective   Patient ID: Daniela Neri is a 54 y.o. female who presents for follow-up regarding sarcoidosis.    HPI 54-year-old female here for f/u re: sarcoidosis. This has involved her liver, skin of her scalp, right breast, and retroperitoneal lymph nodes. She also had hypercalcemia and abdominal pain November 2020. (calcium 14.4)  She was treated with gradual prednisone taper.  Prednisone was reduced to 2.5 mg daily December 2022.,  Prednisone was stopped 3/23.    She currently denies joint pain or low back pain.  She denies any recurrence of rashes.  She denies eye pain, eye redness, or blurred vision.    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  She was asymptomatic at that time.  It was picked up on routine eye exam.  She is currently using prednisone eyedrops about twice weekly, and she was following up regularly with Dr. Lowery.  He last saw her April 2025.  He did mention if her corticosteroid eyedrops are discontinued, she may need to start another immunosuppressive.    Angiogram January 2025 showed mild nonproliferative diabetic retinopathy.     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    Prior to this, last episode of iritis was 2005.       Following is a summary of biopsies:   -Right breast biopsy 3/20: Nonnecrotizing granulomatous inflammation with giant cell reaction.   -Scalp biopsy November 2020: Granulomatous inflammation.   -Liver biopsy October 2020: Nonnecrotizing granulomas with associated fibrosis, mild portal inflammation with preserved bile ducts   -Retroperitoneal lymph node biopsy September 2020: Nonnecrotizing granulomas. Stains for AFB and fungus were negative.     The patient first recalls being hospitalized in the sixth grade when she had mononucleosis. She actually had a bone marrow biopsy at that time, and required IV feedings for a period of time.     She developed symptoms of joint pain when she was in the ninth grade. Sometimes she had neck  pain or swollen toe or finger.     She had several episodes of iritis when she was in her 30s, but has not had iritis since her 30s. (Last episode 2005).    She was seen by me November 2019 because of polyclonal gammopathy. She had a mildly elevated alkaline phosphatase of 184, with mildly elevated liver transaminases. Testing for hepatitis A, B and C was negative. Labs were done at that time which showed a normal serum ACE level, negative HLA-B27, negative citrulline antibody, negative rheumatoid factor, negative antimitochondrial antibody, negative smooth muscle antibody, negative Lyme JUAN CARLOS.     The patient also developed some abdominal pain August 2020, along with increasing liver enzymes. CT of the abdomen and done August 2020 showed marked splenomegaly, enlarged liver with lobulated lobulated contour, moderate mesenteric and retroperitoneal lymphadenopathy, and wall thickening of the proximal duodenum.     Duodenal biopsy done December 2020 was unremarkable. Gastric biopsy was unremarkable. Colon biopsy was unremarkable. Liver biopsy done October 2020 showed well-formed nonnecrotizing granulomas with associated fibrosis, mild portal inflammation with preserved bile ducts, and no fibrosis otherwise demonstrated.     Retroperitoneal lymph node biopsy done September 2020 showed nonnecrotizing granulomas involving fibrous and lymphoid tissue. Stains for AFB and fungus were negative.     The patient was hospitalized November 19, 2020 through November 22, 2020 with nausea and vomiting. She was also found to have hypercalcemia, calcium 14.4. Her alkaline phosphatase had increased as high as 475 June 2020. She was started on prednisone 40 mg daily early 12/20 for presumptive diagnosis of sarcoid.  Prednisone was tapered to 20 mg daily 3/02/21.    Other specialists involved in her care include Dr. Almaraz (hepatology) and Dr. Gutierrez (hematology), as well as Dr. Garcia in dermatology.  They agree the diagnosis of  "sarcoidosis.     DEXA July 2021 was normal.    EKG 6/09/25: normal.     Labs 1/24: CMP normal excepty glucose 203, AST 54 (9-39), ALT 68 (7-45)  Labs 6/24: Hemoglobin A1c 10.6, TSH 1.9, 25-hydroxy vitamin D 42  Labs July 2024: ANCA negative, urine albumin to creatinine ratio 19.7  Labs August 2024: CMP normal except ALT 76 (normal 7-45) and glucose 219, CBC normal, ESR 18  Labs November 2024: CMP normal except glucose 167 (calcium is normal at 9.6)  Labs February 2025: CMP normal except glucose 165 and ALT 35 (6-29)     Medical problem list:   - h/o seronegative spondyloarthropathy (started 9th grade- had neck stiffness, \"sausage digits\" intermittently.   - h/o iritis- last episode 2005   - type 2 DM   - essential hypertension   - depression   - h/o post viral radiculopathy (occurred in her 30s)- occurred in her 30s- Hospitalized for 5 days, had extensive evaluation by neurology including brain MRI, left with right facial muscle weakness   - History of mononucleosis in sixth grade- had bone marrow biopsy.   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  Surgeries: none.     Social history: , no children.   Quit smoking 2009.   Occupation: .     Family history: Mother- ITP   No family h/o ankylosing spondylitis, psoriasis, ulcerative colitis, Crohn's disease.    Health maintenance:  Flu shot November 9, 2023  Pfizer COVID-vaccine November 9, 2023          ROS:  General: Denies fevers or chills.  HEENT: Denies red eye, painful eyes, or blurred vision.  CV: Denies chest pain or palpitations.  Denies leg edema.  Lungs: Denies coughing or shortness of breath.  Skin: Denies rashes or nodules.  MS: Denies joint pain or joint swelling.     Objective   BP " "106/74   Pulse 91   Temp 35.9 °C (96.7 °F)   Ht 1.626 m (5' 4\")   Wt 102 kg (223 lb 12.8 oz)   SpO2 98%   BMI 38.42 kg/m²     Physical Exam  General appearance: Well-nourished well-appearing.  HEENT: PERRL, EOMI  Neck: Supple, no nodes.  CV: RRR, no MGR.  Lungs: Clear, no rales or wheezes.  Abdomen: Soft, nontender. No hepatosplenomegaly.  Extremities:  No cyanosis, clubbing, or edema.  MS: No synovitis.   Skin: No rashes or nodules.  LN: No cervical, supraclavicular, or extra lymphadenopathy.    Assessment/Plan   Problem List Items Addressed This Visit           ICD-10-CM    Hypertension - Primary I10    Relevant Orders    ECG 12 lead (Clinic Performed) (Completed)    Seronegative spondyloarthropathy M47.819    Sarcoidosis D86.9    Relevant Orders    ECG 12 lead (Clinic Performed) (Completed)    BMI 38.0-38.9,adult Z68.38           1. Sarcoidosis- manifested by elevated liver enzymes (especially alkaline phosphatase), hypercalcemia, rash in scalp, hepatosplenomegaly with retroperitoneal lymphadenopathy, history of iritis in her 30s.  Now has a recurrence of anterior uveitis, left eye more than the right eye.  She is currently on prednisone 1% eyedrops-she is now taking eyedrop 2x per week.  Eye exam 4/15 by Dr. Lowery did not show active uveitis.  He mention if her prednisolone eyedrops are discontinued, she may need to start on another immunosuppressive.     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 She also had a facial weakness 2001 (mostly on the right side)-this was diagnosed by her neurologist as being post viral polyradiculopathy after an extensive evaluation, including MRI of the brain. I suspect that this may have been a Bell's palsy related to sarcoid as well.     Confirmatory " biopsies include breast biopsy March 2020, scalp biopsy November 2020, liver biopsy October 2020, retroperitoneal lymph node biopsy September 2020. T spot 9/20 was negative. Stains for AFB and fungus have been negative.     Her predominant manifestations that required treatment in thre past were hypercalcemia and abdominal pain.  Her calcium is currently normal and she is not having abdominal pain.     Prednisone was stopped March 2023 with no recurrence of her previous symptoms.    If she continues to have episodes of iritis, I would consider adding Humira or Remicade.    EKG 6/09/25 is normal.    2. Type 2 diabetes-she will continue follow-up with Kiera Tariq for management. Hemoglobin A1c is currently 8.1 October 2024.  Started  mounjaro.     3. BMI 38-stable, will continue to work on weight loss. On Mounjaro.    4.  Adhesive capsulitis right shoulder-doing better with swimming.    5. Recurrent UTI- saw urology  12/16/24. Added D-mannose.    Plan:  Check labs as ordered by Dr. Lundberg: CBC, CMP, hemoglobin A1c, lipids, TSH, 25-hydroxy vitamin D.  Follow-up in 4 months.  Phone sooner if needed.

## 2025-06-09 NOTE — PATIENT INSTRUCTIONS
Check labs as ordered by Dr. Lundberg: CBC, CMP, hemoglobin A1c, lipids, TSH, 25-hydroxy vitamin D.  Follow-up in 4 months.  Phone sooner if needed.

## 2025-06-10 LAB
25(OH)D3+25(OH)D2 SERPL-MCNC: 56 NG/ML (ref 30–100)
ALBUMIN SERPL-MCNC: 4.5 G/DL (ref 3.6–5.1)
ALBUMIN/CREAT UR: 9 MG/G CREAT
ALP SERPL-CCNC: 64 U/L (ref 37–153)
ALT SERPL-CCNC: 50 U/L (ref 6–29)
ANION GAP SERPL CALCULATED.4IONS-SCNC: 10 MMOL/L (CALC) (ref 7–17)
AST SERPL-CCNC: 29 U/L (ref 10–35)
BILIRUB SERPL-MCNC: 0.5 MG/DL (ref 0.2–1.2)
BUN SERPL-MCNC: 17 MG/DL (ref 7–25)
CALCIUM SERPL-MCNC: 9.8 MG/DL (ref 8.6–10.4)
CHLORIDE SERPL-SCNC: 105 MMOL/L (ref 98–110)
CHOLEST SERPL-MCNC: 181 MG/DL
CHOLEST/HDLC SERPL: 3.5 (CALC)
CO2 SERPL-SCNC: 23 MMOL/L (ref 20–32)
CREAT SERPL-MCNC: 0.59 MG/DL (ref 0.5–1.03)
CREAT UR-MCNC: 127 MG/DL (ref 20–275)
EGFRCR SERPLBLD CKD-EPI 2021: 107 ML/MIN/1.73M2
ERYTHROCYTE [DISTWIDTH] IN BLOOD BY AUTOMATED COUNT: 13.1 % (ref 11–15)
EST. AVERAGE GLUCOSE BLD GHB EST-MCNC: 154 MG/DL
EST. AVERAGE GLUCOSE BLD GHB EST-SCNC: 8.5 MMOL/L
GLUCOSE SERPL-MCNC: 163 MG/DL (ref 65–99)
HBA1C MFR BLD: 7 %
HCT VFR BLD AUTO: 40.1 % (ref 35–45)
HDLC SERPL-MCNC: 51 MG/DL
HGB BLD-MCNC: 13.3 G/DL (ref 11.7–15.5)
LDLC SERPL CALC-MCNC: 103 MG/DL (CALC)
MCH RBC QN AUTO: 28.9 PG (ref 27–33)
MCHC RBC AUTO-ENTMCNC: 33.2 G/DL (ref 32–36)
MCV RBC AUTO: 87 FL (ref 80–100)
MICROALBUMIN UR-MCNC: 1.2 MG/DL
NONHDLC SERPL-MCNC: 130 MG/DL (CALC)
PLATELET # BLD AUTO: 243 THOUSAND/UL (ref 140–400)
PMV BLD REES-ECKER: 12.8 FL (ref 7.5–12.5)
POTASSIUM SERPL-SCNC: 4.7 MMOL/L (ref 3.5–5.3)
PROT SERPL-MCNC: 7.9 G/DL (ref 6.1–8.1)
RBC # BLD AUTO: 4.61 MILLION/UL (ref 3.8–5.1)
SODIUM SERPL-SCNC: 138 MMOL/L (ref 135–146)
TRIGL SERPL-MCNC: 153 MG/DL
TSH SERPL-ACNC: 0.91 MIU/L
WBC # BLD AUTO: 7.6 THOUSAND/UL (ref 3.8–10.8)

## 2025-06-23 ENCOUNTER — APPOINTMENT (OUTPATIENT)
Dept: UROLOGY | Facility: CLINIC | Age: 55
End: 2025-06-23
Payer: COMMERCIAL

## 2025-07-01 ENCOUNTER — APPOINTMENT (OUTPATIENT)
Facility: CLINIC | Age: 55
End: 2025-07-01
Payer: COMMERCIAL

## 2025-07-01 VITALS
HEART RATE: 99 BPM | BODY MASS INDEX: 38.07 KG/M2 | HEIGHT: 64 IN | SYSTOLIC BLOOD PRESSURE: 117 MMHG | DIASTOLIC BLOOD PRESSURE: 68 MMHG | WEIGHT: 223 LBS | TEMPERATURE: 97.9 F

## 2025-07-01 DIAGNOSIS — E11.65 TYPE 2 DIABETES MELLITUS WITH HYPERGLYCEMIA, WITH LONG-TERM CURRENT USE OF INSULIN: Primary | ICD-10-CM

## 2025-07-01 DIAGNOSIS — E78.00 HYPERCHOLESTEROLEMIA: ICD-10-CM

## 2025-07-01 DIAGNOSIS — Z79.4 TYPE 2 DIABETES MELLITUS WITH HYPERGLYCEMIA, WITH LONG-TERM CURRENT USE OF INSULIN: Primary | ICD-10-CM

## 2025-07-01 DIAGNOSIS — N95.1 PERIMENOPAUSE: ICD-10-CM

## 2025-07-01 DIAGNOSIS — E55.9 VITAMIN D DEFICIENCY: ICD-10-CM

## 2025-07-01 PROCEDURE — 3008F BODY MASS INDEX DOCD: CPT | Performed by: STUDENT IN AN ORGANIZED HEALTH CARE EDUCATION/TRAINING PROGRAM

## 2025-07-01 PROCEDURE — 3051F HG A1C>EQUAL 7.0%<8.0%: CPT | Performed by: STUDENT IN AN ORGANIZED HEALTH CARE EDUCATION/TRAINING PROGRAM

## 2025-07-01 PROCEDURE — 3078F DIAST BP <80 MM HG: CPT | Performed by: STUDENT IN AN ORGANIZED HEALTH CARE EDUCATION/TRAINING PROGRAM

## 2025-07-01 PROCEDURE — 3074F SYST BP LT 130 MM HG: CPT | Performed by: STUDENT IN AN ORGANIZED HEALTH CARE EDUCATION/TRAINING PROGRAM

## 2025-07-01 PROCEDURE — 4010F ACE/ARB THERAPY RXD/TAKEN: CPT | Performed by: STUDENT IN AN ORGANIZED HEALTH CARE EDUCATION/TRAINING PROGRAM

## 2025-07-01 PROCEDURE — 99214 OFFICE O/P EST MOD 30 MIN: CPT | Performed by: STUDENT IN AN ORGANIZED HEALTH CARE EDUCATION/TRAINING PROGRAM

## 2025-07-01 NOTE — PROGRESS NOTES
"Patient coming in for follow up for T2DM    Subjective   Daniela Neri is a 54 y.o. female who presents for follow up for Type 2 diabetes mellitus.   Lab Results   Component Value Date    HGBA1C 7.0 (H) 06/09/2025      Daniela Neri is a 54 y.o. female who presents for follow up for Type 2 diabetes mellitus.   Last A1c 7% June 2025  Has sarcoidosis stopped prednisone stopped a year ago  Jardiance caused yeast infection   Current diabetes regimen is as follows:  Glipizide 10mg once a day XL at lunch  Lantus 18 units  Metformin 2,000mg with dinner stopped for 3 weeks   Mounjaro 15 mg weekly has been on it for 6 weeks  Eating better but still have the cravings at night    Taking supplements - ceylon cinnamon 1200 twice daily and triple omega   Starts working at 8am  Eats twice a day  Snacks on pretzels and nuts    Check the my chart messages  BG:  In am:   In pm:  Foot Exam: No neuropathy  Eye Exam: Mild nonproliferative diabetic retinopathy of both eyes without macular edema associated with type 2 diabetes mellitus Last seen in Jan 2025  Lipid Panel: Rosuvastatin 10 mg LDL: 87 TG were 282  Urine Albumin: lisinopril 20 mg\   mini hot flashes here and there. No period for the past year   Was on GC for 2 years around 4 years ago for sarcoidosis  Review of Systems  all pertinent systems reviewed and are otherwise negative   Objective   Visit Vitals  /68 (BP Location: Left arm, Patient Position: Sitting, BP Cuff Size: Large adult)   Pulse 99   Temp 36.6 °C (97.9 °F)   Ht 1.626 m (5' 4\")   Wt 101 kg (223 lb)   BMI 38.28 kg/m²   OB Status Perimenopausal   Smoking Status Never   BSA 2.14 m²      Physical Exam  Constitutional:       General: She is not in acute distress.     Appearance: Normal appearance. She is obese.   HENT:      Head: Normocephalic and atraumatic.     Eyes:      Extraocular Movements: Extraocular movements intact.      Pupils: Pupils are equal, round, and reactive to light.       Cardiovascular:      " Rate and Rhythm: Normal rate and regular rhythm.   Pulmonary:      Effort: Pulmonary effort is normal. No respiratory distress.      Breath sounds: Normal breath sounds.   Abdominal:      General: Bowel sounds are normal.      Palpations: Abdomen is soft.      Tenderness: There is no abdominal tenderness.     Skin:     Coloration: Skin is not jaundiced or pale.      Findings: No erythema or rash.     Neurological:      General: No focal deficit present.      Mental Status: She is alert and oriented to person, place, and time.      Deep Tendon Reflexes: Reflexes normal.     Psychiatric:         Mood and Affect: Mood normal.         Behavior: Behavior normal.         Lab Review  GLUCOSE (mg/dL)   Date Value   06/09/2025 163 (H)   02/27/2025 165 (H)     Glucose (mg/dL)   Date Value   11/06/2024 167 (H)   08/16/2024 209 (H)   01/15/2024 203 (H)     POC HEMOGLOBIN A1c (%)   Date Value   02/18/2025 7.3 (A)   10/16/2024 8.1 (A)     HEMOGLOBIN A1c (%)   Date Value   06/09/2025 7.0 (H)     Hemoglobin A1C (%)   Date Value   06/18/2024 10.6 (H)   01/15/2024 8.5 (H)   07/14/2023 7.8 (A)   10/08/2022 9.0 (A)   07/29/2022 10.4 (A)     CARBON DIOXIDE (mmol/L)   Date Value   06/09/2025 23   02/27/2025 25     Bicarbonate (mmol/L)   Date Value   11/06/2024 25   08/16/2024 24   01/15/2024 24     UREA NITROGEN (BUN) (mg/dL)   Date Value   06/09/2025 17   02/27/2025 14     Urea Nitrogen (mg/dL)   Date Value   11/06/2024 15   08/16/2024 16   01/15/2024 15     Creatinine (mg/dL)   Date Value   11/06/2024 0.50   08/16/2024 0.52   01/15/2024 0.55     CREATININE (mg/dL)   Date Value   06/09/2025 0.59   02/27/2025 0.52     Lab Results   Component Value Date    CHOL 181 06/09/2025    CHOL 188 10/16/2024    CHOL 188 10/08/2022     Lab Results   Component Value Date    HDL 51 06/09/2025    HDL 44.3 10/16/2024    HDL 33.0 (A) 10/08/2022     Lab Results   Component Value Date    LDLCALC 103 (H) 06/09/2025    LDLCALC 87 10/16/2024     Lab Results  "  Component Value Date    TRIG 153 (H) 06/09/2025    TRIG 282 (H) 10/16/2024    TRIG 805 (H) 10/08/2022     No components found for: \"CHOLHDL\"   Lab Results   Component Value Date    TSH 0.91 06/09/2025           Assessment/Plan   Daniela Neri is a 54 y.o. female who presents for follow up for Type 2 diabetes mellitus.   Last A1c 7% June 2025  Jardiance caused yeast infection   Current diabetes regimen is as follows:  Glipizide 10mg once a day XL at lunch  Lantus 18 units  Metformin 2,000mg with dinner stopped for 3 weeks   Mounjaro 15 mg weekly has been on it for 6 weeks  Foot Exam: No neuropathy  Eye Exam: Mild nonproliferative diabetic retinopathy of both eyes without macular edema associated with type 2 diabetes mellitus Last seen in Jan 2025  Lipid Panel: Rosuvastatin 10 mg LDL: 87 TG were 282  Urine Albumin: lisinopril 20 mg\   Was on GC for 2 years around 4 years ago for sarcoidosis    Plan:  Continue mounjaro 15 mg weekly  Stop glipizide  Start Metformin 2000 mg at dinner  Continue lantus 18 units daily.  If BG in am above 150 frequent increase to 20 units and if BG low decrease by 2 ever 2-3 days  EXERCISE EXERCISE  Continue rosuvastatin and lisinopril  Check BG 2-3 times a day with freestyle   In case you need a referral for a psychologist let me know    Blood work      RTC in 4 months  Assessment & Plan  Type 2 diabetes mellitus with hyperglycemia, with long-term current use of insulin    Orders:    Adrenocorticotropic Hormone (ACTH); Future    Cortisol; Future    DHEA-Sulfate; Future    FSH & LH; Future    Estradiol; Future    Renal Function Panel; Future    Hemoglobin A1C; Future    Lipid Panel; Future    Follow Up In Endocrinology; Future    Hypercholesterolemia    Orders:    Lipid Panel; Future    Perimenopause    Orders:    FSH & LH; Future    Estradiol; Future    Vitamin D deficiency               "

## 2025-07-01 NOTE — PATIENT INSTRUCTIONS
Continue mounjaro 15 mg weekly  Stop glipizide  Start Metformin 2000 mg at dinner  Continue lantus 18 units daily.  If BG in am above 150 frequent increase to 20 units and if BG low decrease by 2 ever 2-3 days  EXERCISE EXERCISE  Continue rosuvastatin and lisinopril  Check BG 2-3 times a day with freestyle   In case you need a referral for a psychologist let me know    Blood work      RTC in 4 months=

## 2025-07-14 ENCOUNTER — APPOINTMENT (OUTPATIENT)
Dept: OPHTHALMOLOGY | Facility: CLINIC | Age: 55
End: 2025-07-14
Payer: COMMERCIAL

## 2025-07-17 NOTE — ASSESSMENT & PLAN NOTE
Orders:    Adrenocorticotropic Hormone (ACTH); Future    Cortisol; Future    DHEA-Sulfate; Future    FSH & LH; Future    Estradiol; Future    Renal Function Panel; Future    Hemoglobin A1C; Future    Lipid Panel; Future    Follow Up In Endocrinology; Future

## 2025-07-22 ENCOUNTER — HOSPITAL ENCOUNTER (OUTPATIENT)
Dept: RADIOLOGY | Facility: CLINIC | Age: 55
Discharge: HOME | End: 2025-07-22
Payer: COMMERCIAL

## 2025-07-22 DIAGNOSIS — E11.9 TYPE 2 DIABETES MELLITUS WITHOUT COMPLICATION, WITH LONG-TERM CURRENT USE OF INSULIN: ICD-10-CM

## 2025-07-22 DIAGNOSIS — Z79.4 TYPE 2 DIABETES MELLITUS WITHOUT COMPLICATION, WITH LONG-TERM CURRENT USE OF INSULIN: ICD-10-CM

## 2025-07-22 DIAGNOSIS — E78.2 MIXED HYPERLIPIDEMIA: ICD-10-CM

## 2025-07-22 PROCEDURE — 75571 CT HRT W/O DYE W/CA TEST: CPT

## 2025-09-10 ENCOUNTER — APPOINTMENT (OUTPATIENT)
Dept: OPHTHALMOLOGY | Facility: CLINIC | Age: 55
End: 2025-09-10
Payer: COMMERCIAL

## 2025-10-06 ENCOUNTER — APPOINTMENT (OUTPATIENT)
Dept: RHEUMATOLOGY | Facility: CLINIC | Age: 55
End: 2025-10-06
Payer: COMMERCIAL

## 2025-11-07 ENCOUNTER — APPOINTMENT (OUTPATIENT)
Dept: PRIMARY CARE | Facility: CLINIC | Age: 55
End: 2025-11-07
Payer: COMMERCIAL

## 2025-12-02 ENCOUNTER — APPOINTMENT (OUTPATIENT)
Dept: OPHTHALMOLOGY | Facility: CLINIC | Age: 55
End: 2025-12-02
Payer: COMMERCIAL

## 2025-12-11 ENCOUNTER — APPOINTMENT (OUTPATIENT)
Dept: ENDOCRINOLOGY | Facility: CLINIC | Age: 55
End: 2025-12-11
Payer: COMMERCIAL